# Patient Record
Sex: MALE | Race: BLACK OR AFRICAN AMERICAN | NOT HISPANIC OR LATINO | URBAN - METROPOLITAN AREA
[De-identification: names, ages, dates, MRNs, and addresses within clinical notes are randomized per-mention and may not be internally consistent; named-entity substitution may affect disease eponyms.]

---

## 2017-03-15 ENCOUNTER — OFFICE VISIT (OUTPATIENT)
Dept: OPHTHALMOLOGY | Facility: CLINIC | Age: 62
End: 2017-03-15
Attending: OPHTHALMOLOGY
Payer: COMMERCIAL

## 2017-03-15 DIAGNOSIS — H40.1112 PRIMARY OPEN ANGLE GLAUCOMA OF RIGHT EYE, MODERATE STAGE: Primary | ICD-10-CM

## 2017-03-15 DIAGNOSIS — H40.1123 PRIMARY OPEN ANGLE GLAUCOMA OF LEFT EYE, SEVERE STAGE: ICD-10-CM

## 2017-03-15 PROCEDURE — 99212 OFFICE O/P EST SF 10 MIN: CPT

## 2017-03-15 PROCEDURE — 99212 OFFICE O/P EST SF 10 MIN: CPT | Mod: 25,ZF

## 2017-03-15 ASSESSMENT — EXTERNAL EXAM - RIGHT EYE: OD_EXAM: NORMAL

## 2017-03-15 ASSESSMENT — TONOMETRY
OS_IOP_MMHG: 14
IOP_METHOD: APPLANATION-MMW
OD_IOP_MMHG: 16

## 2017-03-15 ASSESSMENT — VISUAL ACUITY
OD_SC: 20/20
OS_SC: 20/400
OD_SC+: -2
METHOD: SNELLEN - LINEAR

## 2017-03-15 ASSESSMENT — SLIT LAMP EXAM - LIDS
COMMENTS: MEIBOMIAN GLAND DYSFUNCTION
COMMENTS: MEIBOMIAN GLAND DYSFUNCTION

## 2017-03-15 ASSESSMENT — CUP TO DISC RATIO
OD_RATIO: 0.7
OS_RATIO: 0.9

## 2017-03-15 ASSESSMENT — EXTERNAL EXAM - LEFT EYE: OS_EXAM: NORMAL

## 2017-03-15 NOTE — MR AVS SNAPSHOT
After Visit Summary   3/15/2017    Jm Cortez    MRN: [id number]           Patient Information     Date Of Birth          1955        Visit Information        Provider Department      3/15/2017 2:45 PM Negrita Loaiza MD Eye Clinic        Today's Diagnoses     Primary open angle glaucoma of right eye, moderate stage    -  1    Primary open angle glaucoma of left eye, severe stage           Follow-ups after your visit        Follow-up notes from your care team     Return in about 6 months (around 9/15/2017) for visual field 24-2 od, OCT rnfl ou with os dilated.      Your next 10 appointments already scheduled     Sep 13, 2017  2:15 PM CDT   VISUAL FIELD with [facility] EYE VISUAL FIELD   Eye Clinic ([facility])    [address]  [address] Clin [address] [place] [id number]   [phone]            Sep 13, 2017  2:45 PM CDT   RETURN GLAUCOMA with Negrita Loaiza MD   Eye Clinic ([facility])    [address]  [address] [facility]  9[facility] Clin [address] [place] [id number]   [phone]              Who to contact     Please call your clinic at [phone] to:    Ask questions about your health    Make or cancel appointments    Discuss your medicines    Learn about your test results    Speak to your doctor   If you have compliments or concerns about an experience at your clinic, or if you wish to file a complaint, please contact [facility] Physicians Patient Relations at [phone] or email us at [email or web address]         Additional Information About Your Visit        MyChart Information     [employer] is an electronic gateway that provides easy, online access to your medical records. With [employer], you can request a clinic appointment, read your test results, renew a prescription or communicate with your care team.     To sign up for [employer] visit the website at [email or web address]   You will be asked to enter  the access code listed below, as well as some personal information. Please follow the directions to create your username and password.     Your access code is: [email or web address]  Expires: 2017  9:30 AM     Your access code will  in 90 days. If you need help or a new code, please contact your [facility] or call [phone] for assistance.        Care EveryWhere ID     This is your Care EveryWhere ID. This could be used by other organizations to access your [place] medical records  [id number]         Blood Pressure from Last 3 Encounters:   No data found for BP    Weight from Last 3 Encounters:   No data found for Wt              We Performed the Following     Low Vision Central OS     OVF 24-2 Dynamic OD        Primary Care Provider Office Phone # Fax #    Sha Griffiths [phone]       [facility] [address] [place] [address]        Thank you!     Thank you for choosing EYE CLINIC  for your care. Our goal is always to provide you with excellent care. Hearing back from our patients is one way we can continue to improve our services. Please take a few minutes to complete the written survey that you may receive in the mail after your visit with us. Thank you!             Your Updated Medication List - Protect others around you: Learn how to safely use, store and throw away your medicines at www.disposemymeds.org.          This list is accurate as of: 3/15/17  4:21 PM.  Always use your most recent med list.                   Brand Name Dispense Instructions for use    dorzolamide-timolol 2-0.5 % ophthalmic solution    COSOPT    10 mL    Place 1 drop Into the left eye 2 times daily       RANITIDINE HCL PO

## 2017-03-15 NOTE — PROGRESS NOTES
Primary open angle glaucoma (POAG) severe  There is no vision changes. He denies pain, irritation or discomfort. Compliant with medications.     Pachmetry: Thick 580/593    Glaucoma history:  Diode CPC left eye 3-24-15  Selected laser trabeculoplasty (SLT) right eye 08  Argon laser trabeculoplasty right eye 2000  Trabectome right eye 3-11-10  Trabeculectomy with mitomycin-C right eye 7/03 and  9/2011  Trabeculectomy with mitomycin-C left eye 11/2000  Baerveldt left eye (prior to 2003)  Inferotemporal Ahmed/CEIOL left eye 2-    Ocular meds:  Cosopt twice a day LEFT eye (some nasal irritation left side-? cosopt related)    Assessment / Plan  1) Primary open angle glaucoma  Post-op 1 year s/p CPC left eye  Improved from high 20's  Intraocular pressure within normal limits (15/16)    Visual fields today   Right eye 24-2 with superior nasal step, watch for central extension   LVC Stable LEFT eye with temporal island remaining    Plan:  Continue Cosopt twice a day with lid closure    RV 6 months  24-2 right eye only OCT retinal nerve fiber layer both eyes  -dilate left eye for this    Attending Physician Attestation:  Complete documentation of historical and exam elements from today's encounter can be found in the full encounter summary report (not reduplicated in this progress note). I personally obtained the chief complaint(s) and history of present illness. I confirmed and edited asnecessary the review of systems, past medical/surgical history, family history, social history, and examination findings as documented by others; and I examined the patient myself. I personally reviewed the relevant tests, images, and reports as documented above. I formulated and edited as necessary the assessment and plan and discussed the findings and management plan with the patient and family.  - Negrita Loaiza MD 4:08 PM 3/15/2017

## 2017-03-15 NOTE — NURSING NOTE
Chief Complaints and History of Present Illnesses   Patient presents with     Follow Up For     Primary open angle glaucoma (POAG) severe     HPI    Affected eye(s):  Both   Symptoms:        Duration:  6 months   Frequency:  Constant       Do you have eye pain now?:  No      Comments:  Pt. States that he is doing well.  No change in VA BE.  No c/o comfort BE.  Maribeth Courtney COT 2:45 PM March 15, 2017

## 2017-09-13 ENCOUNTER — OFFICE VISIT (OUTPATIENT)
Dept: OPHTHALMOLOGY | Facility: CLINIC | Age: 62
End: 2017-09-13
Attending: OPHTHALMOLOGY
Payer: COMMERCIAL

## 2017-09-13 DIAGNOSIS — H40.1190 POAG (PRIMARY OPEN-ANGLE GLAUCOMA): Primary | ICD-10-CM

## 2017-09-13 DIAGNOSIS — H40.9 SEVERE STAGE GLAUCOMA: ICD-10-CM

## 2017-09-13 DIAGNOSIS — H40.1123 PRIMARY OPEN ANGLE GLAUCOMA OF LEFT EYE, SEVERE STAGE: ICD-10-CM

## 2017-09-13 DIAGNOSIS — H40.1112 PRIMARY OPEN ANGLE GLAUCOMA OF RIGHT EYE, MODERATE STAGE: ICD-10-CM

## 2017-09-13 PROCEDURE — 92133 CPTRZD OPH DX IMG PST SGM ON: CPT | Mod: ZF | Performed by: OPHTHALMOLOGY

## 2017-09-13 PROCEDURE — 92083 EXTENDED VISUAL FIELD XM: CPT | Mod: ZF | Performed by: OPHTHALMOLOGY

## 2017-09-13 PROCEDURE — 99212 OFFICE O/P EST SF 10 MIN: CPT | Mod: ZF

## 2017-09-13 RX ORDER — DORZOLAMIDE HYDROCHLORIDE AND TIMOLOL MALEATE 20; 5 MG/ML; MG/ML
1 SOLUTION/ DROPS OPHTHALMIC 2 TIMES DAILY
Qty: 10 ML | Refills: 11 | Status: SHIPPED | OUTPATIENT
Start: 2017-09-13 | End: 2018-09-21

## 2017-09-13 ASSESSMENT — VISUAL ACUITY
OD_SC+: -2
METHOD: SNELLEN - LINEAR
OS_SC: 20/400 ECC
OD_SC: 20/25

## 2017-09-13 ASSESSMENT — CUP TO DISC RATIO
OD_RATIO: 0.8
OS_RATIO: 0.9

## 2017-09-13 ASSESSMENT — TONOMETRY
OD_IOP_MMHG: 14
IOP_METHOD: APPLANATION
OS_IOP_MMHG: 14
OS_IOP_MMHG: 15
OD_IOP_MMHG: 13
IOP_METHOD: APPLANATION

## 2017-09-13 ASSESSMENT — EXTERNAL EXAM - RIGHT EYE: OD_EXAM: NORMAL

## 2017-09-13 ASSESSMENT — SLIT LAMP EXAM - LIDS
COMMENTS: MEIBOMIAN GLAND DYSFUNCTION
COMMENTS: MEIBOMIAN GLAND DYSFUNCTION

## 2017-09-13 ASSESSMENT — EXTERNAL EXAM - LEFT EYE: OS_EXAM: NORMAL

## 2017-09-13 NOTE — MR AVS SNAPSHOT
After Visit Summary   9/13/2017    Jm Crotez    MRN: [id number]           Patient Information     Date Of Birth          1955        Visit Information        Provider Department      9/13/2017 2:45 PM Negrita Loaiza MD Eye Clinic        Today's Diagnoses     POAG (primary open-angle glaucoma)    -  1    Primary open angle glaucoma of left eye, severe stage        Primary open angle glaucoma of right eye, moderate stage        Severe stage glaucoma           Follow-ups after your visit        Follow-up notes from your care team     Return in about 1 week (around 9/20/2017) for visual field 24-2 od .      Your next 10 appointments already scheduled     Mar 21, 2018  2:15 PM CDT   VISUAL FIELD with [facility] EYE VISUAL FIELD   Eye Clinic ([facility])    [address]  [address] Clin [address] [place] [address]   [phone]            Mar 21, 2018  2:45 PM CDT   RETURN GLAUCOMA with Negrita Loaiza MD   Eye Clinic ([facility])    [address]  [address] [address]   [phone]              Who to contact     Please call your clinic at [phone] to:    Ask questions about your health    Make or cancel appointments    Discuss your medicines    Learn about your test results    Speak to your doctor   If you have compliments or concerns about an experience at your clinic, or if you wish to file a complaint, please contact [facility] Physicians Patient Relations at [phone] or email us at [email or web address]         Additional Information About Your Visit        MyChart Information     C[employer] is an electronic gateway that provides easy, online access to your medical records. With [employer], you can request a clinic appointment, read your test results, renew a prescription or communicate with your care team.     To sign up for [employer] visit the website at  [email or web address]   You will be asked to enter the access code listed below, as well as some personal information. Please follow the directions to create your username and password.     Your access code is: [email or web address]  Expires: 2017  6:30 AM     Your access code will  in 90 days. If you need help or a new code, please contact your [facility] or call [phone] for assistance.        Care EveryWhere ID     This is your Care EveryWhere ID. This could be used by other organizations to access your [place] medical records  [id number]         Blood Pressure from Last 3 Encounters:   No data found for BP    Weight from Last 3 Encounters:   No data found for Wt              We Performed the Following     OCT Optic Nerve RNFL Spectralis OU (both eyes)     OVF 24-2 Dynamic OD          Where to get your medicines      [place] [facility] [place] [address] [facility] [address]  [address] [facility] [address] [address], [place] [address]     Phone:  [phone]     dorzolamide-timolol 2-0.5 % ophthalmic solution          Primary Care Provider Office Phone # Fax #    Sha Griffiths [phone]       [facility] [address] [place] [address]        Equal Access to Services     KULWANT CHACKO AH: Hadii rja stratton hadannao Soanthony, waaxda luqadaha, qaybta kaalmada raciel, sarah galvan. So [facility] [phone].    ATENCIÓN: Si habla español, tiene a alvarado disposición servicios gratuitos de asistencia lingüística. Abby al [phone].    We comply with applicable federal civil rights laws and [place] laws. We do not discriminate on the basis of race, color, national origin, age, disability sex, sexual orientation or gender identity.            Thank you!     Thank you for choosing EYE CLINIC  for your care. Our goal is always to provide you with excellent care. Hearing  back from our patients is one way we can continue to improve our services. Please take a few minutes to complete the written survey that you may receive in the mail after your visit with us. Thank you!             Your Updated Medication List - Protect others around you: Learn how to safely use, store and throw away your medicines at www.disposemymeds.org.          This list is accurate as of: 9/13/17  4:29 PM.  Always use your most recent med list.                   Brand Name Dispense Instructions for use Diagnosis    dorzolamide-timolol 2-0.5 % ophthalmic solution    COSOPT    10 mL    Place 1 drop Into the left eye 2 times daily    Severe stage glaucoma       RANITIDINE HCL PO

## 2017-09-13 NOTE — PROGRESS NOTES
Primary open angle glaucoma (POAG) severe  Pt reports vision has been stable. Eyes are comfortable without pain. Reports good compliance w/ gtts without side effects.      Pachmetry: Thick 580/593    Glaucoma history:  Diode CPC left eye 3-24-15  Selected laser trabeculoplasty (SLT) right eye 08  Argon laser trabeculoplasty right eye 2000  Trabectome right eye 3-11-10  Trabeculectomy with mitomycin-C right eye 7/03 and  9/2011  Trabeculectomy with mitomycin-C left eye 11/2000  Baerveldt left eye (prior to 2003)  Inferotemporal Ahmed/CEIOL left eye 2-    Ocular meds:  Cosopt twice a day LEFT eye (some nasal irritation left side-? cosopt related)    Visual fields today   Right eye 24-2 today w/o demonstration of superior nasal step seen on previous testing 3/2017.    Few scattered areas of decreased sensitivity superior nasally. Watching for central extension.     OCT RNFL today:  Stable in both eyes today, left eye with floor effect, unreliable.      Assessment  Primary open angle glaucoma   Post-op 1.5 year s/p CPC left eye   Improved from high 20's   Intraocular pressure good both eyes  (14/15)  Plan:  Continue Cosopt twice a day with lid closure left eye     RV 6 mo for repeat Octopus visual field 24-2     Attending Physician Attestation:  Complete documentation of historical and exam elements from today's encounter can be found in the full encounter summary report (not reduplicated in this progress note). I personally obtained the chief complaint(s) and history of present illness. I confirmed and edited asnecessary the review of systems, past medical/surgical history, family history, social history, and examination findings as documented by others; and I examined the patient myself. I personally reviewed the relevant tests, images, and reports as documented above. I formulated and edited as necessary the assessment and plan and discussed the findings and management plan with the patient and family.  -  Negrita Loaiza MD 4:21 PM 9/13/2017

## 2017-11-06 ENCOUNTER — OFFICE VISIT (OUTPATIENT)
Dept: OPHTHALMOLOGY | Facility: CLINIC | Age: 62
End: 2017-11-06
Attending: OPHTHALMOLOGY
Payer: COMMERCIAL

## 2017-11-06 DIAGNOSIS — H40.9 SEVERE STAGE GLAUCOMA: ICD-10-CM

## 2017-11-06 DIAGNOSIS — H40.1112 PRIMARY OPEN ANGLE GLAUCOMA OF RIGHT EYE, MODERATE STAGE: Primary | ICD-10-CM

## 2017-11-06 DIAGNOSIS — B30.9 VIRAL CONJUNCTIVITIS OF BOTH EYES: ICD-10-CM

## 2017-11-06 PROCEDURE — 99213 OFFICE O/P EST LOW 20 MIN: CPT | Mod: ZF

## 2017-11-06 ASSESSMENT — TONOMETRY
IOP_METHOD: ICARE
OD_IOP_MMHG: 18
OS_IOP_MMHG: 19

## 2017-11-06 ASSESSMENT — EXTERNAL EXAM - RIGHT EYE: OD_EXAM: NORMAL

## 2017-11-06 ASSESSMENT — VISUAL ACUITY
OD_SC+: -1
METHOD: SNELLEN - LINEAR
OD_SC: 20/20
OS_SC: 20/400 ECC

## 2017-11-06 ASSESSMENT — SLIT LAMP EXAM - LIDS
COMMENTS: MEIBOMIAN GLAND DYSFUNCTION
COMMENTS: MEIBOMIAN GLAND DYSFUNCTION

## 2017-11-06 ASSESSMENT — EXTERNAL EXAM - LEFT EYE: OS_EXAM: NORMAL

## 2017-11-06 ASSESSMENT — CONF VISUAL FIELD
OS_NORMAL: 1
METHOD: COUNTING FINGERS
OD_NORMAL: 1

## 2017-11-06 NOTE — NURSING NOTE
Chief Complaints and History of Present Illnesses   Patient presents with     Consult For     Red eye     HPI    Affected eye(s):  Both   Symptoms:        Frequency:  Constant       Do you have eye pain now?:  No      Comments:  +red BE  Went to Urgent care yesterday they gave him Maxatrol  +discharge more in the am today  States va is the same since last visit  Vianca Vega COT 3:06 PM November 6, 2017

## 2017-11-06 NOTE — MR AVS SNAPSHOT
After Visit Summary   11/6/2017    Jm Cortez    MRN: [id number]           Patient Information     Date Of Birth          1955        Visit Information        Provider Department      11/6/2017 2:45 PM Lisbet Roldan MD Eye Clinic        Today's Diagnoses     Primary open angle glaucoma of right eye, moderate stage    -  1    Severe stage glaucoma - Left Eye        Viral conjunctivitis of both eyes           Follow-ups after your visit        Follow-up notes from your care team     Return if symptoms worsen or fail to improve.      Your next 10 appointments already scheduled     Mar 21, 2018  2:15 PM CDT   VISUAL FIELD with [facility] EYE VISUAL FIELD   Eye Clinic ([facility])    [address]  [address] [facility]  9[facility] Clin [address] [place] [id number] [phone]            Mar 21, 2018  2:45 PM CDT   RETURN GLAUCOMA with Negrita Loaiza MD   Eye Clinic ([facility])    [address]  [address] [facility]  9[facility] Clin [address]a  [facility] [id number] [phone]              Who to contact     Please call your clinic at [phone] to:    Ask questions about your health    Make or cancel appointments    Discuss your medicines    Learn about your test results    Speak to your doctor   If you have compliments or concerns about an experience at your clinic, or if you wish to file a complaint, please contact [facility] Physicians Patient Relations at [phone] or email us at [email or web address]         Additional Information About Your Visit        MyChart Information     [employer] is an electronic gateway that provides easy, online access to your medical records. With [employer], you can request a clinic appointment, read your test results, renew a prescription or communicate with your care team.     To sign up for [employer] visit the website at [email or web address]   You will be asked to enter the access code listed below, as  well as some personal information. Please follow the directions to create your username and password.     Your access code is: [email or web address]  Expires: 2017  5:30 AM     Your access code will  in 90 days. If you need help or a new code, please contact your [facility] Clinic or call [phone] for assistance.        Care EveryWhere ID     This is your Care EveryWhere ID. This could be used by other organizations to access your [place] medical records  [id number]         Blood Pressure from Last 3 Encounters:   No data found for BP    Weight from Last 3 Encounters:   No data found for Wt              Today, you had the following     No orders found for display       Primary Care Provider Office Phone # Fax #    Sha Griffiths [phone]       [facility] [address] [place] [address]        Equal Access to Services     ARY CHACKO : Hadii aad ku hadasho Soomaali, waaxda luqadaha, qaybta kaalmada adeegyada, waxay idiin hayaan mayte kharaadonis luo . So [facility] [phone].    ATENCIÓN: Si habla español, tiene a alvarado disposición servicios gratuitos de asistencia lingüística. Abby al [phone].    We comply with applicable federal civil rights laws and [place] laws. We do not discriminate on the basis of race, color, national origin, age, disability, sex, sexual orientation, or gender identity.            Thank you!     Thank you for choosing EYE CLINIC  for your care. Our goal is always to provide you with excellent care. Hearing back from our patients is one way we can continue to improve our services. Please take a few minutes to complete the written survey that you may receive in the mail after your visit with us. Thank you!             Your Updated Medication List - Protect others around you: Learn how to safely use, store and throw away your medicines at www.disposemymeds.org.          This list is accurate as of: 17  4:46  PM.  Always use your most recent med list.                   Brand Name Dispense Instructions for use Diagnosis    dorzolamide-timolol 2-0.5 % ophthalmic solution    COSOPT    10 mL    Place 1 drop Into the left eye 2 times daily    Severe stage glaucoma       RANITIDINE HCL PO

## 2017-11-06 NOTE — PROGRESS NOTES
HPI  Jm Cortez is a 62 year old male who presents with red, watery eyes since Saturday evening. He developed a cough and runny noseabout 1 week followed by right eye redness on Saturday evning and left eye redness on Sunday. He started having watery discharge in both eyes as well. He went to urgent care where he was started on maxitrol drops and guaifenasin with codeine for the cough. VA is unchanged and the eyes are comfortable.      Assessment & Plan    (B30.9) Viral conjunctivitis of both eyes (primary encounter diagnosis)  Comment: started on Saturday right eye followed by left eye, no signs of blebitis  Plan: Discontinue Maxitrol. Start artificial tears for comfort, good hand hygiene    (H40.1112) Primary open angle glaucoma of right eye, moderate stage  (H40.9) Severe stage glaucoma - Left Eye   Comment: follows with Dr. Loaiza  Plan: monitor    -----------------------------------------------------------------------------------    Patient disposition:   Return if symptoms worsen or fail to improve.     Kristel Olivera MD  PGY-2 Ophthalmology  [phone]    Teaching statement:  Complete documentation of historical and exam elements from today's encounter can be found in the full encounter summary report (not reduplicated in this progress note). I personally obtained the chief complaint(s) and history of present illness.  I confirmed and edited as necessary the review of systems, past medical/surgical history, family history, social history, and examination findings as documented by others; and I examined the patient myself. I personally reviewed the relevant tests, images, and reports as documented above.     I formulated and edited as necessary the assessment and plan and discussed the findings and management plan with the patient and family.    Lisbet Roldan MD  Comprehensive Ophthalmology & Ocular Pathology  Department of Ophthalmology and Visual Neurosciences  [email or web address]  Pager  [phone]

## 2018-01-19 DIAGNOSIS — H40.1132 PRIMARY OPEN ANGLE GLAUCOMA OF BOTH EYES, MODERATE STAGE: Primary | ICD-10-CM

## 2018-01-19 DIAGNOSIS — H40.9 GLAUCOMA, LEFT EYE: ICD-10-CM

## 2018-01-19 NOTE — TELEPHONE ENCOUNTER
Replacement for cosopt needed    Last Office Visit: 11/6/17  Future Office visit:   3/21/18  Last Note:Office Visit     11/6/2017  Eye Clinic    Lisbet Roldan MD   Ophthalmology    Primary open angle glaucoma of right eye, moderate stage +2 more   Dx    Consult For ; Referred by Sha Griffiths MD   Reason for visit    Progress Notes         HPI  Jm Cortez is a 62 year old male who presents with red, watery eyes since Saturday evening. He developed a cough and runny noseabout 1 week followed by right eye redness on Saturday evning and left eye redness on Sunday. He started having watery discharge in both eyes as well. He went to urgent care where he was started on maxitrol drops and guaifenasin with codeine for the cough. VA is unchanged and the eyes are comfortable.        Assessment & Plan   Assessment & Plan     (B30.9) Viral conjunctivitis of both eyes (primary encounter diagnosis)  Comment: started on Saturday right eye followed by left eye, no signs of blebitis  Plan: Discontinue Maxitrol. Start artificial tears for comfort, good hand hygiene     (H40.1112) Primary open angle glaucoma of right eye, moderate stage  (H40.9) Severe stage glaucoma - Left Eye   Comment: follows with Dr. Loaiza  Plan: monitor     -----------------------------------------------------------------------------------     Patient disposition:   Return if symptoms worsen or fail to improve.      Kristel Olivera MD  PGY-2 Ophthalmology  [phone]     Teaching statement:  Complete documentation of historical and exam elements from today's encounter can be found in the full encounter summary report (not reduplicated in this progress note). I personally obtained the chief complaint(s) and history of present illness.  I confirmed and edited as necessary the review of systems, past medical/surgical history, family history, social history, and examination findings as documented by others; and I examined the patient myself. I  personally reviewed the relevant tests, images, and reports as documented above.      I formulated and edited as necessary the assessment and plan and discussed the findings and management plan with the patient and family.     Lisbet Roldan MD  Comprehensive Ophthalmology & Ocular Pathology  Department of Ophthalmology and Visual Neurosciences  [email or web address]  Pager [phone]            Routing refill request to provider for review/approval because:  New rx needed

## 2018-03-21 ENCOUNTER — APPOINTMENT (OUTPATIENT)
Dept: OPHTHALMOLOGY | Facility: CLINIC | Age: 63
End: 2018-03-21
Attending: OPHTHALMOLOGY
Payer: COMMERCIAL

## 2018-03-21 DIAGNOSIS — H40.1133 PRIMARY OPEN ANGLE GLAUCOMA OF BOTH EYES, SEVERE STAGE: Primary | ICD-10-CM

## 2018-03-21 PROCEDURE — 92083 EXTENDED VISUAL FIELD XM: CPT | Mod: ZF | Performed by: OPHTHALMOLOGY

## 2018-03-21 PROCEDURE — G0463 HOSPITAL OUTPT CLINIC VISIT: HCPCS | Mod: ZF

## 2018-03-21 ASSESSMENT — CUP TO DISC RATIO: OD_RATIO: 0.8

## 2018-03-21 ASSESSMENT — TONOMETRY
IOP_METHOD: APPLANATION
OS_IOP_MMHG: 12
OS_IOP_MMHG: 17
IOP_METHOD: APPLANATION
OD_IOP_MMHG: 18
OD_IOP_MMHG: 12

## 2018-03-21 ASSESSMENT — SLIT LAMP EXAM - LIDS
COMMENTS: MEIBOMIAN GLAND DYSFUNCTION
COMMENTS: MEIBOMIAN GLAND DYSFUNCTION

## 2018-03-21 ASSESSMENT — EXTERNAL EXAM - RIGHT EYE: OD_EXAM: NORMAL

## 2018-03-21 ASSESSMENT — CONF VISUAL FIELD
OD_NORMAL: 1
OS_NORMAL: 1

## 2018-03-21 ASSESSMENT — EXTERNAL EXAM - LEFT EYE: OS_EXAM: NORMAL

## 2018-03-21 ASSESSMENT — VISUAL ACUITY
METHOD: SNELLEN - LINEAR
OD_SC: 20/25
OS_SC: CF @ 3'

## 2018-03-21 NOTE — PROGRESS NOTES
Primary open angle glaucoma (POAG) severe  Interval history:  No eye pain, vision changes, tolerating drops well    Pachmetry: Thick 580/593    Glaucoma history:  Diode CPC left eye 3-24-15  Selected laser trabeculoplasty (SLT) right eye 08  Argon laser trabeculoplasty right eye 2000  Trabectome right eye 3-11-10  Trabeculectomy with mitomycin-C right eye 7/03 and  9/2011  Trabeculectomy with mitomycin-C left eye 11/2000  Baerveldt left eye (prior to 2003)  Inferotemporal Ahmed/CEIOL left eye 2-    Ocular meds:  Cosopt twice a day LEFT eye (some nasal irritation left side-? cosopt related)    Visual fields today   Right eye 24-2 03/21/18 w/o demonstration of superior nasal step seen on previous testing 3/2017.    Few nonspecific defects    OCT RNFL 9/2017:  Stable in both eyes today, left eye with floor effect, unreliable.      Assessment  Primary open angle glaucoma   IOP excellent today    visual field stable     Plan:  Continue Cosopt twice a day with lid closure left eye     RV 6 mo for OCT retinal nerve fiber layer right eye       Logan Ocampo MD  PGY3     Attending Physician Attestation:  Complete documentation of historical and exam elements from today's encounter can be found in the full encounter summary report (not reduplicated in this progress note). I personally obtained the chief complaint(s) and history of present illness. I confirmed and edited asnecessary the review of systems, past medical/surgical history, family history, social history, and examination findings as documented by others; and I examined the patient myself. I personally reviewed the relevant tests, images, and reports as documented above. I formulated and edited as necessary the assessment and plan and discussed the findings and management plan with the patient and family.  - Negrita Loaiza MD 3:21 PM 3/21/2018

## 2018-03-21 NOTE — MR AVS SNAPSHOT
After Visit Summary   3/21/2018    Jm Cortez    MRN: [id number]           Patient Information     Date Of Birth          1955        Visit Information        Provider Department      3/21/2018 2:45 PM Negrita Loaiza MD [facility]        Today's Diagnoses     Primary open angle glaucoma of both eyes, severe stage    -  1       Follow-ups after your visit        Follow-up notes from your care team     Return in about 6 months (around 2018) for  od.      Your next 10 appointments already scheduled     Sep 26, 2018  2:45 PM CDT   RETURN GLAUCOMA with Negrita Loaiza MD   Eye Clinic ([facility])    [facility] [address]  9th Fl Clin [address] [place] [address] [id number] [phone]              Who to contact     Please call your clinic at [phone] to:    Ask questions about your health    Make or cancel appointments    Discuss your medicines    Learn about your test results    Speak to your doctor            Additional Information About Your Visit        MyChart Information     Luxr is an electronic gateway that provides easy, online access to your medical records. With Luxr, you can request a clinic appointment, read your test results, renew a prescription or communicate with your care team.     To sign up for [employer] visit the website at [email or web address]   You will be asked to enter the access code listed below, as well as some personal information. Please follow the directions to create your username and password.     Your access code is: [email or web address]  Expires: 2018  7:30 AM     Your access code will  in 90 days. If you need help or a new code, please contact your [facility] or call [phone] for assistance.        Care EveryWhere ID     This is your Care EveryWhere ID. This could be used by other organizations to access your [facility] medical records  [id number]         Blood Pressure  from Last 3 Encounters:   No data found for BP    Weight from Last 3 Encounters:   No data found for Wt              We Performed the Following     OVF 24-2 Dynamic OD        Primary Care Provider Office Phone # Fax #    Sha Griffiths [phone]       [facility] [address] [place] [address]        Equal Access to Services     [facility]: Hadii aad ku hadasho Soomaali, waaxda luqadaha, qaybta kaalmada adeegyada, waxay idiin hayaan adeeg kharash la'aan ah. So [facility] [phone].    ATENCIÓN: Si habla espmarybeth, tiene a alvarado disposición servicios gratuitos de asistencia lingüística. Abby al [phone].    We comply with applicable federal civil rights laws and [place] laws. We do not discriminate on the basis of race, color, national origin, age, disability, sex, sexual orientation, or gender identity.            Thank you!     Thank you for choosing EYE CLINIC  for your care. Our goal is always to provide you with excellent care. Hearing back from our patients is one way we can continue to improve our services. Please take a few minutes to complete the written survey that you may receive in the mail after your visit with us. Thank you!             Your Updated Medication List - Protect others around you: Learn how to safely use, store and throw away your medicines at www.disposemymeds.org.          This list is accurate as of 3/21/18  3:32 PM.  Always use your most recent med list.                   Brand Name Dispense Instructions for use Diagnosis    * dorzolamide-timolol 2-0.5 % ophthalmic solution    COSOPT    10 mL    Place 1 drop Into the left eye 2 times daily    Severe stage glaucoma       * dorzolamide-timolol PF 22.3-6.8 MG/ML opthalmic solution    COSOPT PF    60 each    Apply 1 drop to eye 2 times daily 1DROP TO LEFT EYE TWICE DAILY    Primary open angle glaucoma of both eyes, moderate stage       RANITIDINE HCL PO           * Notice:  This list has 2  medication(s) that are the same as other medications prescribed for you. Read the directions carefully, and ask your doctor or other care provider to review them with you.

## 2018-09-21 DIAGNOSIS — H40.9 SEVERE STAGE GLAUCOMA: ICD-10-CM

## 2018-09-21 RX ORDER — DORZOLAMIDE HYDROCHLORIDE AND TIMOLOL MALEATE 20; 5 MG/ML; MG/ML
1 SOLUTION/ DROPS OPHTHALMIC 2 TIMES DAILY
Qty: 10 ML | Refills: 1 | Status: SHIPPED | OUTPATIENT
Start: 2018-09-21 | End: 2018-09-26

## 2018-09-21 NOTE — TELEPHONE ENCOUNTER
Medication: cosopt      Last Written Prescription Date:  1/19/18  Last Fill Quantity: 60   # refills: 4    Last Office Visit: 3/21/18  Future Office visit: 9/26/18  Continue Cosopt twice a day with lid closure left eye

## 2018-09-26 ENCOUNTER — OFFICE VISIT (OUTPATIENT)
Dept: OPHTHALMOLOGY | Facility: CLINIC | Age: 63
End: 2018-09-26
Attending: OPHTHALMOLOGY
Payer: COMMERCIAL

## 2018-09-26 DIAGNOSIS — H40.1190 POAG (PRIMARY OPEN-ANGLE GLAUCOMA): Primary | ICD-10-CM

## 2018-09-26 DIAGNOSIS — H40.9 SEVERE STAGE GLAUCOMA: ICD-10-CM

## 2018-09-26 PROCEDURE — 92133 CPTRZD OPH DX IMG PST SGM ON: CPT | Mod: ZF | Performed by: OPHTHALMOLOGY

## 2018-09-26 PROCEDURE — G0463 HOSPITAL OUTPT CLINIC VISIT: HCPCS | Mod: ZF

## 2018-09-26 RX ORDER — DORZOLAMIDE HYDROCHLORIDE AND TIMOLOL MALEATE 20; 5 MG/ML; MG/ML
1 SOLUTION/ DROPS OPHTHALMIC 2 TIMES DAILY
Qty: 10 ML | Refills: 1 | Status: SHIPPED | OUTPATIENT
Start: 2018-09-26 | End: 2019-03-27

## 2018-09-26 ASSESSMENT — CONF VISUAL FIELD
OS_INFERIOR_NASAL_RESTRICTION: 1
OD_NORMAL: 1
OS_SUPERIOR_NASAL_RESTRICTION: 1
METHOD: COUNTING FINGERS
OS_INFERIOR_TEMPORAL_RESTRICTION: 3

## 2018-09-26 ASSESSMENT — EXTERNAL EXAM - LEFT EYE: OS_EXAM: NORMAL

## 2018-09-26 ASSESSMENT — REFRACTION_WEARINGRX
OD_SPHERE: +1.00
OS_CYLINDER: +0.50
OD_CYLINDER: +0.50
OS_AXIS: 024
OS_ADD: +2.50
OD_AXIS: 156
OD_ADD: +2.50
OS_SPHERE: +0.75

## 2018-09-26 ASSESSMENT — TONOMETRY
OS_IOP_MMHG: 13
IOP_METHOD: APPLANATION
OD_IOP_MMHG: 11

## 2018-09-26 ASSESSMENT — SLIT LAMP EXAM - LIDS
COMMENTS: MEIBOMIAN GLAND DYSFUNCTION
COMMENTS: MEIBOMIAN GLAND DYSFUNCTION

## 2018-09-26 ASSESSMENT — EXTERNAL EXAM - RIGHT EYE: OD_EXAM: NORMAL

## 2018-09-26 ASSESSMENT — VISUAL ACUITY
METHOD: SNELLEN - LINEAR
OS_SC: 20/300 ECC
OD_SC+: +1
OD_PH_SC: 20/25
OD_SC: 20/30

## 2018-09-26 ASSESSMENT — CUP TO DISC RATIO: OD_RATIO: 0.8

## 2018-09-26 NOTE — NURSING NOTE
Chief Complaints and History of Present Illnesses   Patient presents with     Follow Up For     6 month follow up POAG     HPI    Affected eye(s):  Both   Symptoms:     No floaters   No flashes   No redness   No tearing   No Dryness   No itching         Do you have eye pain now?:  No      Comments:  Pt states vision is the same as last visit.     Yusuf DAVILA September 26, 2018 3:03 PM

## 2018-09-26 NOTE — PROGRESS NOTES
Primary open angle glaucoma (POAG) severe  Interval history:  No eye pain, vision changes, tolerating drops well    Pachmetry: Thick 580/593    Glaucoma history:  Diode CPC left eye 3-24-15  Selected laser trabeculoplasty (SLT) right eye 08  Argon laser trabeculoplasty right eye 2000  Trabectome right eye 3-11-10  Trabeculectomy with mitomycin-C right eye 7/03 and  9/2011  Trabeculectomy with mitomycin-C left eye 11/2000  Baerveldt left eye (prior to 2003)  Inferotemporal Ahmed/CEIOL left eye 2-    Ocular meds:  Cosopt twice a day LEFT eye (some nasal irritation left side-? cosopt related)    Visual fields 3/2018   Right eye 24-2 03/21/18 w/o demonstration of superior nasal step seen on previous testing 3/2017.    Few nonspecific defects    OCT RNFL 9/2018:  Stable in right eye today, left eye with floor effect, unreliable.      Assessment   Primary open angle glaucoma   IOP excellent today    visual field stable     Plan:  Continue Cosopt twice a day with lid closure left eye     RV 6 mo for OVF 24-2  Right eye     Adeel Osborn M.D.  PGY-3, Ophthalmology    Attending Physician Attestation:  Complete documentation of historical and exam elements from today's encounter can be found in the full encounter summary report (not reduplicated in this progress note). I personally obtained the chief complaint(s) and history of present illness. I confirmed and edited asnecessary the review of systems, past medical/surgical history, family history, social history, and examination findings as documented by others; and I examined the patient myself. I personally reviewed the relevant tests, images, and reports as documented above. I formulated and edited as necessary the assessment and plan and discussed the findings and management plan with the patient and family.  - Negrita Loaiza MD 3:48 PM 9/26/2018

## 2018-09-26 NOTE — MR AVS SNAPSHOT
After Visit Summary   2018    Jm Cortez    MRN: [id number]           Patient Information     Date Of Birth          1955        Visit Information        Provider Department      2018 2:45 PM Negrita Loaiza MD [facility]        Today's Diagnoses     POAG (primary open-angle glaucoma) - Both Eyes    -  1    Severe stage glaucoma           Follow-ups after your visit        Follow-up notes from your care team     Return for visual field 24-2 od .      Your next 10 appointments already scheduled     Mar 27, 2019  2:45 PM CDT   RETURN GLAUCOMA with Negrita Loaiza MD   Eye Clinic ([facility])    [address]  9th [address] Clin [address]  [facility] [address]   [phone]              Who to contact     Please call your clinic at [phone] to:    Ask questions about your health    Make or cancel appointments    Discuss your medicines    Learn about your test results    Speak to your doctor            Additional Information About Your Visit        MyChart Information     [email or web address] is an electronic gateway that provides easy, online access to your medical records. With Firebase, you can request a clinic appointment, read your test results, renew a prescription or communicate with your care team.     To sign up for [email or web address] visit the website at [email or web address]   You will be asked to enter the access code listed below, as well as some personal information. Please follow the directions to create your username and password.     Your access code is: [email or web address]  Expires: 2018  6:30 AM     Your access code will  in 90 days. If you need help or a new code, please contact your [facility] or call [phone] for assistance.        Care EveryWhere ID     This is your Care EveryWhere ID. This could be used by other organizations to access your [facility] medical records  [id number]         Blood  Pressure from Last 3 Encounters:   No data found for BP    Weight from Last 3 Encounters:   No data found for Wt              We Performed the Following     OCT Optic Nerve RNFL Spectralis OD (right eye)          Where to get your medicines      These medications were sent to [facility] [address] [place] [address] [address] [facility]  [address] [facility] [address]     Phone:  [phone]     dorzolamide-timolol 2-0.5 % ophthalmic solution          Primary Care Provider Office Phone # Fax #    Sha Griffiths [phone]       [facility] [address] [place] [address]        Equal Access to Services     [facility] : Hadii raj stratton hadasho Soanthony, waaxda luqadaha, qaybta kaalmada adeegyada, sarah galvan. So [facility] [phone].    ATENCIÓN: Si habla español, tiene a alvarado disposición servicios gratuitos de asistencia lingüística. Llame al [phone].    We comply with applicable federal civil rights laws and [place] laws. We do not discriminate on the basis of race, color, national origin, age, disability, sex, sexual orientation, or gender identity.            Thank you!     Thank you for choosing EYE CLINIC  for your care. Our goal is always to provide you with excellent care. Hearing back from our patients is one way we can continue to improve our services. Please take a few minutes to complete the written survey that you may receive in the mail after your visit with us. Thank you!             Your Updated Medication List - Protect others around you: Learn how to safely use, store and throw away your medicines at www.disposemymeds.org.          This list is accurate as of 9/26/18  3:57 PM.  Always use your most recent med list.                   Brand Name Dispense Instructions for use Diagnosis    dorzolamide-timolol 2-0.5 % ophthalmic solution    COSOPT    10 mL    Place 1 drop Into the left eye 2 times daily     Severe stage glaucoma       omeprazole 20 MG CR capsule    priLOSEC     1 capsule daily

## 2019-03-27 ENCOUNTER — OFFICE VISIT (OUTPATIENT)
Dept: OPHTHALMOLOGY | Facility: CLINIC | Age: 64
End: 2019-03-27
Attending: OPHTHALMOLOGY
Payer: COMMERCIAL

## 2019-03-27 DIAGNOSIS — H40.9 SEVERE STAGE GLAUCOMA: ICD-10-CM

## 2019-03-27 DIAGNOSIS — H40.1190 POAG (PRIMARY OPEN-ANGLE GLAUCOMA): Primary | ICD-10-CM

## 2019-03-27 DIAGNOSIS — H40.1133 PRIMARY OPEN ANGLE GLAUCOMA OF BOTH EYES, SEVERE STAGE: ICD-10-CM

## 2019-03-27 PROCEDURE — 92083 EXTENDED VISUAL FIELD XM: CPT | Mod: ZF | Performed by: OPHTHALMOLOGY

## 2019-03-27 PROCEDURE — G0463 HOSPITAL OUTPT CLINIC VISIT: HCPCS | Mod: ZF

## 2019-03-27 RX ORDER — DORZOLAMIDE HYDROCHLORIDE AND TIMOLOL MALEATE 20; 5 MG/ML; MG/ML
1 SOLUTION/ DROPS OPHTHALMIC 2 TIMES DAILY
Qty: 10 ML | Refills: 3 | Status: SHIPPED | OUTPATIENT
Start: 2019-03-27 | End: 2020-11-13

## 2019-03-27 ASSESSMENT — REFRACTION_WEARINGRX
OD_CYLINDER: +0.50
OD_SPHERE: +1.00
OS_CYLINDER: +0.50
OS_SPHERE: +0.75
OD_ADD: +2.50
OD_AXIS: 155
OS_AXIS: 025
OS_ADD: +2.50

## 2019-03-27 ASSESSMENT — VISUAL ACUITY
OD_CC: 20/20
CORRECTION_TYPE: GLASSES
METHOD: SNELLEN - LINEAR

## 2019-03-27 ASSESSMENT — CUP TO DISC RATIO: OD_RATIO: 0.8

## 2019-03-27 ASSESSMENT — EXTERNAL EXAM - RIGHT EYE: OD_EXAM: NORMAL

## 2019-03-27 ASSESSMENT — CONF VISUAL FIELD
OS_SUPERIOR_TEMPORAL_RESTRICTION: 1
OD_NORMAL: 1
OS_SUPERIOR_NASAL_RESTRICTION: 3
OS_INFERIOR_NASAL_RESTRICTION: 3
METHOD: COUNTING FINGERS
OS_INFERIOR_TEMPORAL_RESTRICTION: 3

## 2019-03-27 ASSESSMENT — SLIT LAMP EXAM - LIDS
COMMENTS: MEIBOMIAN GLAND DYSFUNCTION
COMMENTS: MEIBOMIAN GLAND DYSFUNCTION

## 2019-03-27 ASSESSMENT — TONOMETRY
IOP_METHOD: APPLANATION
OS_IOP_MMHG: 16
OD_IOP_MMHG: 14

## 2019-03-27 ASSESSMENT — EXTERNAL EXAM - LEFT EYE: OS_EXAM: NORMAL

## 2019-03-27 NOTE — PROGRESS NOTES
Primary open angle glaucoma (POAG) severe  Interval history:  No eye pain, vision changes, tolerating drops well    Pachmetry: Thick 580/593    Glaucoma history:  Diode CPC left eye 3-24-15  Selected laser trabeculoplasty (SLT) right eye 08  Argon laser trabeculoplasty right eye 2000  Trabectome right eye 3-11-10  Trabeculectomy with mitomycin-C right eye 7/03 and  9/2011  Trabeculectomy with mitomycin-C left eye 11/2000  Baerveldt left eye (prior to 2003)  Inferotemporal Ahmed/CEIOL left eye 2-    Ocular meds:  Cosopt twice a day LEFT eye     Visual fields 3/2019   Right eye 24-2 03/21/18 w/o nasal loss as before   Few nonspecific defects    OCT RNFL 9/2018:  Stable in right eye today, left eye with floor effect, unreliable.      Assessment   Primary open angle glaucoma   IOP good today    visual field stable     Plan:  Continue Cosopt twice a day with lid closure left eye     RV 6 mo for OCT retinal nerve fiber layer   Right eye     Attending Physician Attestation:  Complete documentation of historical and exam elements from today's encounter can be found in the full encounter summary report (not reduplicated in this progress note). I personally obtained the chief complaint(s) and history of present illness. I confirmed and edited asnecessary the review of systems, past medical/surgical history, family history, social history, and examination findings as documented by others; and I examined the patient myself. I personally reviewed the relevant tests, images, and reports as documented above. I formulated and edited as necessary the assessment and plan and discussed the findings and management plan with the patient and family.  - Negrita Loaiza MD 4:00 PM 3/27/2019

## 2019-03-27 NOTE — NURSING NOTE
Chief Complaints and History of Present Illnesses   Patient presents with     Glaucoma Follow-Up     Chief Complaint(s) and History of Present Illness(es)     Glaucoma Follow-Up     Laterality: both eyes    Quality: States va is the same since last visit      Associated symptoms: Negative for redness and dryness    Treatment side effects: none    Compliance with Treatment: always    Pain scale: 0/10              Comments     Vianca CUELLAR 3:07 PM March 27, 2019

## 2019-09-30 ENCOUNTER — OFFICE VISIT (OUTPATIENT)
Dept: OPHTHALMOLOGY | Facility: CLINIC | Age: 64
End: 2019-09-30
Attending: OPHTHALMOLOGY
Payer: COMMERCIAL

## 2019-09-30 DIAGNOSIS — H40.1133 PRIMARY OPEN ANGLE GLAUCOMA OF BOTH EYES, SEVERE STAGE: Primary | ICD-10-CM

## 2019-09-30 PROCEDURE — 92133 CPTRZD OPH DX IMG PST SGM ON: CPT | Mod: ZF | Performed by: OPHTHALMOLOGY

## 2019-09-30 PROCEDURE — G0463 HOSPITAL OUTPT CLINIC VISIT: HCPCS | Mod: ZF

## 2019-09-30 RX ORDER — SILDENAFIL CITRATE 20 MG/1
1 TABLET ORAL PRN
Refills: 11 | COMMUNITY
Start: 2019-08-29

## 2019-09-30 ASSESSMENT — CONF VISUAL FIELD
METHOD: COUNTING FINGERS
OS_INFERIOR_TEMPORAL_RESTRICTION: 2
OD_NORMAL: 1
OS_SUPERIOR_TEMPORAL_RESTRICTION: 1
OS_SUPERIOR_NASAL_RESTRICTION: 2
OS_INFERIOR_NASAL_RESTRICTION: 2

## 2019-09-30 ASSESSMENT — CUP TO DISC RATIO: OD_RATIO: 0.8

## 2019-09-30 ASSESSMENT — VISUAL ACUITY
METHOD: SNELLEN - LINEAR
OD_CC: 20/20
CORRECTION_TYPE: GLASSES

## 2019-09-30 ASSESSMENT — REFRACTION_WEARINGRX
OS_CYLINDER: +0.50
OD_AXIS: 155
OS_ADD: +2.50
OD_SPHERE: +1.00
OD_ADD: +2.50
OS_AXIS: 025
OD_CYLINDER: +0.50
OS_SPHERE: +0.75

## 2019-09-30 ASSESSMENT — EXTERNAL EXAM - LEFT EYE: OS_EXAM: NORMAL

## 2019-09-30 ASSESSMENT — TONOMETRY
OS_IOP_MMHG: 15
IOP_METHOD: APPLANATION
OD_IOP_MMHG: 15

## 2019-09-30 ASSESSMENT — EXTERNAL EXAM - RIGHT EYE: OD_EXAM: NORMAL

## 2019-09-30 ASSESSMENT — SLIT LAMP EXAM - LIDS
COMMENTS: MEIBOMIAN GLAND DYSFUNCTION
COMMENTS: MEIBOMIAN GLAND DYSFUNCTION

## 2019-09-30 NOTE — PROGRESS NOTES
Primary open angle glaucoma (POAG) severe  Interval history:  No eye pain, vision changes, tolerating drops well  Retired age 63    Pachmetry: Thick 580/593    Glaucoma history:  Diode CPC left eye 3-24-15  Selected laser trabeculoplasty (SLT) right eye 08  Argon laser trabeculoplasty right eye 2000  Trabectome right eye 3-11-10  Trabeculectomy with mitomycin-C right eye 7/03 and  9/2011  Trabeculectomy with mitomycin-C left eye 11/2000  Baerveldt left eye (prior to 2003)  Inferotemporal Ahmed/CEIOL left eye 2-    Ocular meds:  Cosopt twice a day LEFT eye     Visual fields 3/2019   Right eye 24-2 03/21/18 w/o nasal loss as before   Few nonspecific defects    OCT RNFL 9/2019:  Stable in right eye today     Assessment   Primary open angle glaucoma   IOP good today    OCT RNFL stable     Plan:  Continue Cosopt twice a day with lid closure left eye     RV 6 mo for OCTOPUS VISUAL FIELD 24-2 Right eye     Justin Lau MD  Ophthalmology Resident, PGY-3    Attending Physician Attestation:  Complete documentation of historical and exam elements from today's encounter can be found in the full encounter summary report (not reduplicated in this progress note). I personally obtained the chief complaint(s) and history of present illness. I confirmed and edited asnecessary the review of systems, past medical/surgical history, family history, social history, and examination findings as documented by others; and I examined the patient myself. I personally reviewed the relevant tests, images, and reports as documented above. I formulated and edited as necessary the assessment and plan and discussed the findings and management plan with the patient and family.  - Negrita Loaiza MD 3:45 PM 9/30/2019

## 2019-09-30 NOTE — NURSING NOTE
Chief Complaints and History of Present Illnesses   Patient presents with     Glaucoma Follow-Up     6 month follow-up Primary open angle glaucoma      Chief Complaint(s) and History of Present Illness(es)     Glaucoma Follow-Up     Comments: 6 month follow-up Primary open angle glaucoma               Comments     Pt denies any significant vision changes in either eye since last visit.  Denies any pain, pressure, irritation, discharge, and tearing.  Currently using Cosopt BID SARAN Handy OT 3:01 PM September 30, 2019

## 2019-12-11 ENCOUNTER — TELEPHONE (OUTPATIENT)
Dept: OPHTHALMOLOGY | Facility: CLINIC | Age: 64
End: 2019-12-11

## 2019-12-16 DIAGNOSIS — H40.9 SEVERE STAGE GLAUCOMA: ICD-10-CM

## 2019-12-16 RX ORDER — DORZOLAMIDE HYDROCHLORIDE AND TIMOLOL MALEATE 20; 5 MG/ML; MG/ML
1 SOLUTION/ DROPS OPHTHALMIC 2 TIMES DAILY
Qty: 10 ML | Refills: 1 | Status: SHIPPED | OUTPATIENT
Start: 2019-12-16 | End: 2020-11-13

## 2019-12-16 NOTE — TELEPHONE ENCOUNTER
I spoke to patient who has eye fatigue and his eye twitches.  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  He is interested in tinted lens assessment.  It is scheduled

## 2019-12-16 NOTE — TELEPHONE ENCOUNTER
[facility] Call Center    Phone Message    May a detailed message be left on voicemail: yes    Reason for Call: Medication Refill Request    Has the patient contacted the pharmacy for the refill? Yes   Name of medication being requested: dorzolamide-timolol (COSOPT) 2-0.5 % ophthalmic solution  Provider who prescribed the medication: Dr. James  Pharmacy:    [facility] [address] [place] [address]      Date medication is needed: asap - pt is almost out   \      Action Taken: Message routed to:  [facility] Eye

## 2019-12-16 NOTE — TELEPHONE ENCOUNTER
Last Clinic Visit: 9/30/19, [place] 4/2/20  Last clinic note: Continue Cosopt twice a day with lid closure left eye

## 2019-12-23 ENCOUNTER — ALLIED HEALTH/NURSE VISIT (OUTPATIENT)
Dept: OPHTHALMOLOGY | Facility: CLINIC | Age: 64
End: 2019-12-23
Attending: OPHTHALMOLOGY
Payer: COMMERCIAL

## 2019-12-23 DIAGNOSIS — H53.143 PHOTOPHOBIA OF BOTH EYES: Primary | ICD-10-CM

## 2019-12-23 ASSESSMENT — PACHYMETRY
OS_CT(UM): 593
OD_CT(UM): 580

## 2020-03-25 ENCOUNTER — TELEPHONE (OUTPATIENT)
Dept: OPHTHALMOLOGY | Facility: CLINIC | Age: 65
End: 2020-03-25

## 2020-03-25 NOTE — TELEPHONE ENCOUNTER
COVID-19 RESCHEDULES    Date contacted: March 25, 2020 1:16 PM    Type of contact:  left message    Current appointment date: Thurs, 4/2    Attending provider: Mario    Current Eye Symptoms: n/a    Refills needed: n/a    Best contact for rescheduling: n/a    Best date/time for rescheduling: n/a    Lluvia TANNER 1:16 PM March 25, 2020

## 2020-05-04 ENCOUNTER — TELEPHONE (OUTPATIENT)
Dept: OPHTHALMOLOGY | Facility: CLINIC | Age: 65
End: 2020-05-04

## 2020-05-04 NOTE — LETTER
May 4, 2020         Jm Cortez  [address] [place] [id number]        Dear Jm:    We attempted to reach you by telephone regarding your appointment on 5/11/20 with Dr. Martin.  Unfortunately, your appointment has been canceled at this time; as we have been asked to cancel all clinics due the concern for COVID-19.  Please contact our appointment line at [phone] to reschedule on a Tuesday or Wednesday coming up OR MyChart our clinic.  Ask to speak to the eye clinic triage nurse if you are unable to get an appointment within the next week or two through the call center.        We apologize for any inconvenience this may have caused and look forward to hearing form you.        Sincerely,         Dr Aidee Martin MD   Department of Ophthalmology   [facility]

## 2020-05-04 NOTE — TELEPHONE ENCOUNTER
COVID-19 RESCHEDULES    Date contacted: May 4, 2020 3:41 PM    Type of contact: Spoke with patient or left message    Current appointment date: 5/11/20    Attending provider: Mario    Current Eye Symptoms: n/a    Refills needed: n/a    Best contact for rescheduling: same    Best date/time for rescheduling: Tuesday or Wednesday upcoming (double book him if needed - gave him call center number and told him to ask for Triage nurse to get booked in as call center probably won't be able to double book him) larry Mitchell COA 3:41 PM May 4, 2020

## 2020-05-04 NOTE — TELEPHONE ENCOUNTER
Reviewed message below from tech  Spoke to pt at 0915  Scheduled may 12th at 2 PM with Dr. Martin    Pt aware of date/time/location at [facility]    Note to facilitator  Delonte Case RN 9:22 AM 05/05/20    ----      Forwarded to Eye Triage per previous message.    Please call Pt Jm Cortez MRN [id number]    [phone]

## 2020-05-12 ENCOUNTER — OFFICE VISIT (OUTPATIENT)
Dept: OPHTHALMOLOGY | Facility: CLINIC | Age: 65
End: 2020-05-12
Attending: OPHTHALMOLOGY
Payer: COMMERCIAL

## 2020-05-12 DIAGNOSIS — Z96.1 PSEUDOPHAKIA OF LEFT EYE: ICD-10-CM

## 2020-05-12 DIAGNOSIS — H40.1133 PRIMARY OPEN ANGLE GLAUCOMA OF BOTH EYES, SEVERE STAGE: Primary | ICD-10-CM

## 2020-05-12 DIAGNOSIS — H25.11 NUCLEAR SENILE CATARACT OF RIGHT EYE: ICD-10-CM

## 2020-05-12 PROCEDURE — G0463 HOSPITAL OUTPT CLINIC VISIT: HCPCS | Mod: ZF

## 2020-05-12 PROCEDURE — 92083 EXTENDED VISUAL FIELD XM: CPT | Mod: ZF | Performed by: OPHTHALMOLOGY

## 2020-05-12 ASSESSMENT — REFRACTION_WEARINGRX
OS_CYLINDER: +0.50
OD_SPHERE: +1.00
OS_ADD: +2.50
OS_SPHERE: +0.75
OD_ADD: +2.50
OD_CYLINDER: +0.50
OS_AXIS: 025
OD_AXIS: 155

## 2020-05-12 ASSESSMENT — VISUAL ACUITY
METHOD: SNELLEN - LINEAR
OD_CC: 20/20
OD_CC+: +
CORRECTION_TYPE: GLASSES
OS_CC: CF @ 3'

## 2020-05-12 ASSESSMENT — CONF VISUAL FIELD
OS_SUPERIOR_TEMPORAL_RESTRICTION: 3
OS_INFERIOR_TEMPORAL_RESTRICTION: 1
OS_SUPERIOR_NASAL_RESTRICTION: 1
OD_NORMAL: 1
OS_INFERIOR_NASAL_RESTRICTION: 1
METHOD: COUNTING FINGERS

## 2020-05-12 ASSESSMENT — TONOMETRY
OS_IOP_MMHG: 15
IOP_METHOD: APPLANATION
OD_IOP_MMHG: 15

## 2020-05-12 ASSESSMENT — EXTERNAL EXAM - LEFT EYE: OS_EXAM: NORMAL

## 2020-05-12 ASSESSMENT — SLIT LAMP EXAM - LIDS
COMMENTS: MEIBOMIAN GLAND DYSFUNCTION
COMMENTS: MEIBOMIAN GLAND DYSFUNCTION

## 2020-05-12 ASSESSMENT — EXTERNAL EXAM - RIGHT EYE: OD_EXAM: NORMAL

## 2020-05-12 NOTE — PATIENT INSTRUCTIONS
Patient will continue on Cosopt (Timolol/Dorzolamide) which is a blue top drop 2x/day (12 hours apart) in the LEFT EYE ONLY.  Patient will return to clinic in 4-6 months with visual field test, dilated eye exam and disc photos.

## 2020-05-12 NOTE — PROGRESS NOTES
1)POAG -- s/p Diode TSCPC OS (3/24/15 by Dr. Loaiza), s/p Trab OD x2 (9/2011 and 7/03), s/p Trab OS (11/2000), s/p IT Ahmed tube OS (2/21/06), s/p Trabectome OD (3/11/2010), s/p Baerveldt OS prior to 2003, s/p SLT OD in 2008, s/p ALT OD in 200), setiology of vison loss OS  -- K pachy: 580/593 in 2014   Tmax:  OS:27 per old notes   HVF: OD:Nasal dec sens and OS:Dense superior and inferuor arcaute defects (temproal [place])     CDR:     HRT/OCT: Mod-Severe RNFL thinning (stable from 4093-7702      FHX of Glc: No      Gonio:       Intolerant to: AGN -- injection, Lumigan?travatan -- injection     Asthma/COPD: No   Steroid Use: No    Kidney Stones: No    Sulfa Allergy: No     IOP targets:  2)PCIOL OS -- VA OD:20/25 and OS:20/400 in 2011   3)?H/O Uveitis per old notes  4)NS OD -- works at [employer]       Patient will continue on Cosopt (Timolol/Dorzolamide) which is a blue top drop 2x/day (12 hours apart) in the LEFT EYE ONLY.  Patient will return to clinic in 4-6 months with visual field test, dilated eye exam and disc photos.      Attending Physician Attestation:  Complete documentation of historical and exam elements from today's encounter can be found in the full encounter summary report (not reduplicated in this progress note). I personally obtained the chief complaint(s) and history of present illness.  I confirmed and edited as necessary the review of systems, past medical/surgical history, family history, social history, and examination findings as documented by others; and I examined the patient myself. I personally reviewed the relevant tests, images, and reports as documented above. I formulated and edited as necessary the assessment and plan and discussed the findings and management plan with the patient and family.  - Aidee Martin MD

## 2020-05-12 NOTE — NURSING NOTE
Chief Complaints and History of Present Illnesses   Patient presents with     Follow Up     Primary open angle glaucoma of both eyes     Chief Complaint(s) and History of Present Illness(es)     Follow Up     Laterality: both eyes    Course: stable    Associated symptoms: Negative for eye pain, dryness, redness and tearing    Treatments tried: eye drops    Pain scale: 0/10    Comments: Primary open angle glaucoma of both eyes              Comments     He states that his vision has seemed stable in both eyes, since his last eye exam.  Patient denies having any eye discomfort.  He is very compliant in using Cosopt twice a day in his left eye.    Linda Bishop, COT 2:10 PM  May 12, 2020

## 2020-11-11 DIAGNOSIS — H40.1133 PRIMARY OPEN ANGLE GLAUCOMA OF BOTH EYES, SEVERE STAGE: Primary | ICD-10-CM

## 2020-11-13 DIAGNOSIS — H40.9 SEVERE STAGE GLAUCOMA: ICD-10-CM

## 2020-11-13 RX ORDER — DORZOLAMIDE HYDROCHLORIDE AND TIMOLOL MALEATE 20; 5 MG/ML; MG/ML
1 SOLUTION/ DROPS OPHTHALMIC 2 TIMES DAILY
Qty: 10 ML | Refills: 1 | Status: SHIPPED | OUTPATIENT
Start: 2020-11-13 | End: 2020-11-17

## 2020-11-13 NOTE — TELEPHONE ENCOUNTER
Pt recommended about a 4-6 month f/u from last may's visit    Rx sent with request to call clinic for appt on Rx for further refills    Delonte Case RN 3:17 PM 11/13/20        [facility]    Phone Message    May a detailed message be left on voicemail: yes     Reason for Call: Medication Refill Request    Has the patient contacted the pharmacy for the refill? Yes   Name of medication being requested: dorzolamide-timolol (COSOPT) 2-0.5 % ophthalmic solution   Provider who prescribed the medication: Opal Zelaya RN  Pharmacy: [facility] [address] [place] [address]  Date medication is needed: ASAP     Pt called wondering if he could get this Rx refilled before the Appt on 11/17/20 as he is out of the eye drops. Please advise, thank you!    Action Taken: Message routed to:  [facility]    Travel Screening: Not Applicable

## 2020-11-16 NOTE — PROGRESS NOTES
CC: glaucoma follow up      HPI: Jm Cortez is here for glaucoma follow up.     s/p Diode TSCPC OS (3/24/15 by Dr. Loaiza), s/p Trab OD x2 (9/2011 and 7/03), s/p Trab OS (11/2000), s/p IT Ahmed tube OS (2/21/06), s/p Trabectome OD (3/11/2010), s/p Baerveldt OS prior to 2003, s/p SLT OD in 2008, s/p ALT OD in 200)     A/P:  1)POAG --     -  FHX of Glc: No     - K pachy: 580/593 in 2014   Tmax:  OS:27 per old notes   CDR:       Gonio:       - HVF: OD:Nasal dec sens and OS:Dense superior and inferuor arcaute defects (temproal island)         - HRT/OCT: Mod-Severe RNFL thinning (stable from 0445-5631         - Intolerant to: AGN -- injection, Lumigan?travatan -- injection     Asthma/COPD: No   Steroid Use: No    Kidney Stones: No    Sulfa Allergy: No     IOP targets:    2)PCIOL OS -- VA OD:20/25 and OS:20/400 in 2011     3)?H/O Uveitis per old notes    4)NS OD -- works at [employer]       Patient will continue on Cosopt (Timolol/Dorzolamide) which is a blue top drop 2x/day (12 hours apart) in the LEFT EYE ONLY.  Patient will return to clinic in 4-6 months with visual field test right eye only, dilated eye exam and disc photos.        Complete documentation of historical and exam elements from today's encounter can be found in the full encounter summary report (not reduplicated in this progress note). I personally obtained the chief complaint(s) and history of present illness.  I confirmed and edited as necessary the review of systems, past medical/surgical history, family history, social history, and examination findings as documented by others; and I examined the patient myself. I personally reviewed the relevant tests, images, and reports as documented above. I formulated and edited as necessary the assessment and plan and discussed the findings and management plan with the patient and family.    Kartik Soliz MD

## 2020-11-17 ENCOUNTER — OFFICE VISIT (OUTPATIENT)
Dept: OPHTHALMOLOGY | Facility: CLINIC | Age: 65
End: 2020-11-17
Attending: OPHTHALMOLOGY
Payer: COMMERCIAL

## 2020-11-17 DIAGNOSIS — H40.9 SEVERE STAGE GLAUCOMA: ICD-10-CM

## 2020-11-17 DIAGNOSIS — Z96.1 PSEUDOPHAKIA OF LEFT EYE: ICD-10-CM

## 2020-11-17 DIAGNOSIS — H40.1133 PRIMARY OPEN ANGLE GLAUCOMA OF BOTH EYES, SEVERE STAGE: Primary | ICD-10-CM

## 2020-11-17 DIAGNOSIS — H25.11 NUCLEAR SENILE CATARACT OF RIGHT EYE: ICD-10-CM

## 2020-11-17 PROCEDURE — G0463 HOSPITAL OUTPT CLINIC VISIT: HCPCS

## 2020-11-17 PROCEDURE — 92014 COMPRE OPH EXAM EST PT 1/>: CPT | Performed by: OPHTHALMOLOGY

## 2020-11-17 PROCEDURE — 92133 CPTRZD OPH DX IMG PST SGM ON: CPT | Performed by: OPHTHALMOLOGY

## 2020-11-17 PROCEDURE — 92250 FUNDUS PHOTOGRAPHY W/I&R: CPT | Performed by: OPHTHALMOLOGY

## 2020-11-17 PROCEDURE — 99207 OPTIC NERVE PHOTOS OU (BOTH EYES): CPT | Performed by: OPHTHALMOLOGY

## 2020-11-17 RX ORDER — DORZOLAMIDE HYDROCHLORIDE AND TIMOLOL MALEATE 20; 5 MG/ML; MG/ML
1 SOLUTION/ DROPS OPHTHALMIC 2 TIMES DAILY
Qty: 10 ML | Refills: 3 | Status: SHIPPED | OUTPATIENT
Start: 2020-11-17 | End: 2021-11-03

## 2020-11-17 ASSESSMENT — VISUAL ACUITY
CORRECTION_TYPE: GLASSES
METHOD: SNELLEN - LINEAR
OD_CC: 20/20
OD_CC+: -2

## 2020-11-17 ASSESSMENT — EXTERNAL EXAM - LEFT EYE: OS_EXAM: NORMAL

## 2020-11-17 ASSESSMENT — CUP TO DISC RATIO: OD_RATIO: 0.7

## 2020-11-17 ASSESSMENT — SLIT LAMP EXAM - LIDS
COMMENTS: MEIBOMIAN GLAND DYSFUNCTION
COMMENTS: MEIBOMIAN GLAND DYSFUNCTION

## 2020-11-17 ASSESSMENT — REFRACTION_WEARINGRX
OD_CYLINDER: +0.50
OS_AXIS: 025
OD_SPHERE: +1.00
OD_ADD: +2.50
OS_SPHERE: +0.75
OS_ADD: +2.50
OD_AXIS: 155
OS_CYLINDER: +0.50

## 2020-11-17 ASSESSMENT — CONF VISUAL FIELD
OS_INFERIOR_NASAL_RESTRICTION: 1
METHOD: COUNTING FINGERS
OS_SUPERIOR_TEMPORAL_RESTRICTION: 3
OD_NORMAL: 1
OS_SUPERIOR_NASAL_RESTRICTION: 1
OS_INFERIOR_TEMPORAL_RESTRICTION: 1

## 2020-11-17 ASSESSMENT — EXTERNAL EXAM - RIGHT EYE: OD_EXAM: NORMAL

## 2020-11-17 ASSESSMENT — TONOMETRY
OS_IOP_MMHG: 13
OD_IOP_MMHG: 14
IOP_METHOD: APPLANATION

## 2020-11-17 NOTE — NURSING NOTE
Chief Complaints and History of Present Illnesses   Patient presents with     Glaucoma     POAG, both eyes - previous Dr. Martin pt     Chief Complaint(s) and History of Present Illness(es)     Glaucoma     Laterality: left eye    Quality: blurred    Treatment side effects: none    Compliance with Treatment: always    Comments: POAG, both eyes - previous Dr. Martin pt              Comments     Pt denies any significant vision changes in either eye since last visit.  Denies any pain, irritation, discharge, and tearing.  Ocular meds: Cosopt BID DANGELO Roa OT 2:03 PM November 17, 2020

## 2021-05-12 DIAGNOSIS — H40.1133 PRIMARY OPEN ANGLE GLAUCOMA OF BOTH EYES, SEVERE STAGE: Primary | ICD-10-CM

## 2021-05-18 ENCOUNTER — OFFICE VISIT (OUTPATIENT)
Dept: OPHTHALMOLOGY | Facility: CLINIC | Age: 66
End: 2021-05-18
Attending: OPHTHALMOLOGY
Payer: COMMERCIAL

## 2021-05-18 DIAGNOSIS — H25.11 NUCLEAR SENILE CATARACT OF RIGHT EYE: ICD-10-CM

## 2021-05-18 DIAGNOSIS — Z96.1 PSEUDOPHAKIA OF LEFT EYE: ICD-10-CM

## 2021-05-18 DIAGNOSIS — H40.1133 PRIMARY OPEN ANGLE GLAUCOMA OF BOTH EYES, SEVERE STAGE: ICD-10-CM

## 2021-05-18 DIAGNOSIS — H40.9 SEVERE STAGE GLAUCOMA: Primary | ICD-10-CM

## 2021-05-18 PROCEDURE — 92083 EXTENDED VISUAL FIELD XM: CPT | Performed by: OPHTHALMOLOGY

## 2021-05-18 PROCEDURE — G0463 HOSPITAL OUTPT CLINIC VISIT: HCPCS | Mod: 25

## 2021-05-18 PROCEDURE — 92012 INTRM OPH EXAM EST PATIENT: CPT | Performed by: OPHTHALMOLOGY

## 2021-05-18 ASSESSMENT — TONOMETRY
OS_IOP_MMHG: 17
OD_IOP_MMHG: 15
IOP_METHOD: ICARE

## 2021-05-18 ASSESSMENT — CONF VISUAL FIELD
OS_SUPERIOR_TEMPORAL_RESTRICTION: 2
OS_INFERIOR_TEMPORAL_RESTRICTION: 1
OS_SUPERIOR_NASAL_RESTRICTION: 1
OS_INFERIOR_NASAL_RESTRICTION: 1
METHOD: COUNTING FINGERS
OD_NORMAL: 1

## 2021-05-18 ASSESSMENT — VISUAL ACUITY
OS_CC: CF @ 2 FT
METHOD: SNELLEN - LINEAR
OD_CC: 20/20
CORRECTION_TYPE: GLASSES

## 2021-05-18 ASSESSMENT — SLIT LAMP EXAM - LIDS
COMMENTS: MEIBOMIAN GLAND DYSFUNCTION
COMMENTS: MEIBOMIAN GLAND DYSFUNCTION

## 2021-05-18 ASSESSMENT — EXTERNAL EXAM - RIGHT EYE: OD_EXAM: NORMAL

## 2021-05-18 ASSESSMENT — REFRACTION_WEARINGRX
OD_AXIS: 155
OD_CYLINDER: +0.50
OS_ADD: +2.50
OD_SPHERE: +1.00
OS_CYLINDER: +0.50
OD_ADD: +2.50
OS_SPHERE: +0.75
OS_AXIS: 025

## 2021-05-18 ASSESSMENT — CUP TO DISC RATIO: OD_RATIO: 0.7

## 2021-05-18 ASSESSMENT — EXTERNAL EXAM - LEFT EYE: OS_EXAM: NORMAL

## 2021-05-18 NOTE — PROGRESS NOTES
CC: glaucoma follow up      HPI: Jm Cortez is here for glaucoma follow up.     s/p Diode TSCPC OS (3/24/15 by Dr. Loaiza), s/p Trab OD x2 (9/2011 and 7/03), s/p Trab OS (11/2000), s/p IT Ahmed tube OS (2/21/06), s/p Trabectome OD (3/11/2010), s/p Baerveldt OS prior to 2003, s/p SLT OD in 2008, s/p ALT OD in 200)     A/P:  1)POAG --     -  FHX of Glc: No     - K pachy: 580/593 in 2014   Tmax:  OS:27 per old notes   CDR:       Gonio:       - HVF: OD:Nasal dec sens (fluctuation) and OS:Dense superior and inferuor arcaute defects (temproal island) not repeating since 2017     - HRT/OCT: Mod-Severe RNFL thinning (stable from 6464-2793         - Intolerant to: AGN -- injection, Lumigan?travatan -- injection     Asthma/COPD: No   Steroid Use: No    Kidney Stones: No    Sulfa Allergy: No     IOP targets:    2)PCIOL OS -- VA OD:20/25 and OS:20/400 in 2011     3)?H/O Uveitis per old notes    4)NS OD -- works at [employer]       Patient will continue on Cosopt (Timolol/Dorzolamide) which is a blue top drop 2x/day (12 hours apart) in the LEFT EYE ONLY.  Patient will return to clinic in 4-6 months with visual field test right eye 24-2 and left eye LVC and IOP check      Complete documentation of historical and exam elements from today's encounter can be found in the full encounter summary report (not reduplicated in this progress note). I personally obtained the chief complaint(s) and history of present illness.  I confirmed and edited as necessary the review of systems, past medical/surgical history, family history, social history, and examination findings as documented by others; and I examined the patient myself. I personally reviewed the relevant tests, images, and reports as documented above. I formulated and edited as necessary the assessment and plan and discussed the findings and management plan with the patient and family.    Kartik Soliz MD

## 2021-10-28 DIAGNOSIS — H40.1133 PRIMARY OPEN ANGLE GLAUCOMA OF BOTH EYES, SEVERE STAGE: Primary | ICD-10-CM

## 2021-11-03 ENCOUNTER — OFFICE VISIT (OUTPATIENT)
Dept: OPHTHALMOLOGY | Facility: CLINIC | Age: 66
End: 2021-11-03
Attending: OPHTHALMOLOGY
Payer: COMMERCIAL

## 2021-11-03 DIAGNOSIS — Z96.1 PSEUDOPHAKIA OF LEFT EYE: ICD-10-CM

## 2021-11-03 DIAGNOSIS — H25.11 NUCLEAR SENILE CATARACT OF RIGHT EYE: ICD-10-CM

## 2021-11-03 DIAGNOSIS — H40.9 SEVERE STAGE GLAUCOMA: Primary | ICD-10-CM

## 2021-11-03 DIAGNOSIS — H40.1133 PRIMARY OPEN ANGLE GLAUCOMA OF BOTH EYES, SEVERE STAGE: ICD-10-CM

## 2021-11-03 PROCEDURE — G0463 HOSPITAL OUTPT CLINIC VISIT: HCPCS

## 2021-11-03 PROCEDURE — 92083 EXTENDED VISUAL FIELD XM: CPT | Performed by: OPHTHALMOLOGY

## 2021-11-03 PROCEDURE — 92014 COMPRE OPH EXAM EST PT 1/>: CPT | Performed by: OPHTHALMOLOGY

## 2021-11-03 RX ORDER — DORZOLAMIDE HYDROCHLORIDE AND TIMOLOL MALEATE 20; 5 MG/ML; MG/ML
1 SOLUTION/ DROPS OPHTHALMIC 2 TIMES DAILY
Qty: 10 ML | Refills: 3 | Status: SHIPPED | OUTPATIENT
Start: 2021-11-03 | End: 2023-02-14

## 2021-11-03 ASSESSMENT — SLIT LAMP EXAM - LIDS
COMMENTS: MEIBOMIAN GLAND DYSFUNCTION
COMMENTS: MEIBOMIAN GLAND DYSFUNCTION

## 2021-11-03 ASSESSMENT — REFRACTION_WEARINGRX
OD_SPHERE: +1.00
OS_AXIS: 025
OS_CYLINDER: +0.50
OS_SPHERE: +0.75
SPECS_TYPE: BIFOCAL
OD_CYLINDER: +0.50
OD_AXIS: 155
OD_ADD: +2.50
OS_ADD: +2.50

## 2021-11-03 ASSESSMENT — CONF VISUAL FIELD
OS_SUPERIOR_TEMPORAL_RESTRICTION: 1
OS_INFERIOR_TEMPORAL_RESTRICTION: 1
METHOD: COUNTING FINGERS
OS_INFERIOR_NASAL_RESTRICTION: 1
OD_NORMAL: 1
OS_SUPERIOR_NASAL_RESTRICTION: 3

## 2021-11-03 ASSESSMENT — EXTERNAL EXAM - RIGHT EYE: OD_EXAM: NORMAL

## 2021-11-03 ASSESSMENT — EXTERNAL EXAM - LEFT EYE: OS_EXAM: NORMAL

## 2021-11-03 ASSESSMENT — VISUAL ACUITY
OS_CC: CF @ 2'
OD_CC: 20/20
CORRECTION_TYPE: GLASSES
METHOD: SNELLEN - LINEAR

## 2021-11-03 ASSESSMENT — TONOMETRY
OD_IOP_MMHG: 15
IOP_METHOD: TONOPEN
OS_IOP_MMHG: 18

## 2021-11-03 ASSESSMENT — CUP TO DISC RATIO: OD_RATIO: 0.7

## 2021-11-03 NOTE — NURSING NOTE
Chief Complaints and History of Present Illnesses   Patient presents with     Glaucoma Follow-Up     IOP Recheck      Chief Complaint(s) and History of Present Illness(es)     Glaucoma Follow-Up     Laterality: both eyes    Associated symptoms: eye pain (intermittent - every now and then).  Negative for flashes, floaters, burning, swelling and itching    Treatment side effects: none    Compliance with Treatment: always    Pain scale: 0/10              IOP Recheck                Comments     Pt here today for 6 mos glaucoma follow up & visual field testing.  Nothing to report, occasional pain in LE but not consistent enough to consider bothersome.    Ocular meds =   dorzolamide-timolol (COSOPT) 2-0.5 % ophthalmic solution 1gtts LE Q BID    Jamia TANNER, JENISE 2:42 PM 11/03/2021

## 2021-11-03 NOTE — PROGRESS NOTES
CC: glaucoma follow up      HPI: Jm Cortez is here for glaucoma follow up.     s/p Diode TSCPC OS (3/24/15 by Dr. Loaiza), s/p Trab OD x2 (9/2011 and 7/03), s/p Trab OS (11/2000), s/p IT Ahmed tube OS (2/21/06), s/p Trabectome OD (3/11/2010), s/p Baerveldt OS prior to 2003, s/p SLT OD in 2008, s/p ALT OD in 200)     A/P:  1)POAG --     -  FHX of Glc: No     - K pachy: 580/593 in 2014   Tmax:  OS:27 per old notes   CDR:       Gonio:       - HVF: OD:Nasal dec sens (fluctuation) stable compared to previous and OS:Dense superior and inferuor arcaute defects (temproal island) not repeating since 2017     - HRT/OCT: Mod-Severe RNFL thinning (stable from 6271-2904         - Intolerant to: AGN -- injection, Lumigan?travatan -- injection     Asthma/COPD: No   Steroid Use: No    Kidney Stones: No    Sulfa Allergy: No     IOP targets:    2)PCIOL OS -- VA OD:20/25 and OS:20/400 in 2011     3)?H/O Uveitis per old notes    4)NS OD -- works at [employer] [facility]       Patient will continue on Cosopt (Timolol/Dorzolamide) which is a blue top drop 2x/day (12 hours apart) in the LEFT EYE ONLY.  Patient will return to clinic in 4-6 months with OCT RNFL  and IOP check      Complete documentation of historical and exam elements from today's encounter can be found in the full encounter summary report (not reduplicated in this progress note). I personally obtained the chief complaint(s) and history of present illness.  I confirmed and edited as necessary the review of systems, past medical/surgical history, family history, social history, and examination findings as documented by others; and I examined the patient myself. I personally reviewed the relevant tests, images, and reports as documented above. I formulated and edited as necessary the assessment and plan and discussed the findings and management plan with the patient and family.    Kartik Soliz MD

## 2022-05-10 DIAGNOSIS — H40.1133 PRIMARY OPEN ANGLE GLAUCOMA OF BOTH EYES, SEVERE STAGE: Primary | ICD-10-CM

## 2022-05-17 ENCOUNTER — OFFICE VISIT (OUTPATIENT)
Dept: OPHTHALMOLOGY | Facility: CLINIC | Age: 67
End: 2022-05-17
Attending: OPHTHALMOLOGY
Payer: COMMERCIAL

## 2022-05-17 ENCOUNTER — TELEPHONE (OUTPATIENT)
Dept: OPHTHALMOLOGY | Facility: CLINIC | Age: 67
End: 2022-05-17

## 2022-05-17 DIAGNOSIS — H26.492 PCO (POSTERIOR CAPSULAR OPACIFICATION), LEFT: ICD-10-CM

## 2022-05-17 DIAGNOSIS — Z96.1 PSEUDOPHAKIA OF LEFT EYE: ICD-10-CM

## 2022-05-17 DIAGNOSIS — H25.11 NUCLEAR SENILE CATARACT OF RIGHT EYE: ICD-10-CM

## 2022-05-17 DIAGNOSIS — H40.9 SEVERE STAGE GLAUCOMA: ICD-10-CM

## 2022-05-17 DIAGNOSIS — H40.1133 PRIMARY OPEN ANGLE GLAUCOMA OF BOTH EYES, SEVERE STAGE: Primary | ICD-10-CM

## 2022-05-17 PROCEDURE — 92014 COMPRE OPH EXAM EST PT 1/>: CPT | Mod: 25 | Performed by: OPHTHALMOLOGY

## 2022-05-17 PROCEDURE — 250N000009 HC RX 250: Performed by: OPHTHALMOLOGY

## 2022-05-17 PROCEDURE — 92133 CPTRZD OPH DX IMG PST SGM ON: CPT | Performed by: OPHTHALMOLOGY

## 2022-05-17 PROCEDURE — G0463 HOSPITAL OUTPT CLINIC VISIT: HCPCS | Mod: 25

## 2022-05-17 PROCEDURE — 66821 AFTER CATARACT LASER SURGERY: CPT | Mod: LT | Performed by: OPHTHALMOLOGY

## 2022-05-17 RX ORDER — LOTEPREDNOL ETABONATE 5 MG/ML
1-2 SUSPENSION/ DROPS OPHTHALMIC 3 TIMES DAILY
Qty: 5 ML | Refills: 0 | Status: SHIPPED | OUTPATIENT
Start: 2022-05-17 | End: 2022-05-17

## 2022-05-17 RX ORDER — PREDNISOLONE ACETATE 10 MG/ML
1-2 SUSPENSION/ DROPS OPHTHALMIC 3 TIMES DAILY
Qty: 5 ML | Refills: 0 | Status: SHIPPED | OUTPATIENT
Start: 2022-05-17 | End: 2022-05-22

## 2022-05-17 RX ADMIN — APRACLONIDINE HYDROCHLORIDE 1 DROP: 10 SOLUTION/ DROPS OPHTHALMIC at 09:44

## 2022-05-17 ASSESSMENT — SLIT LAMP EXAM - LIDS
COMMENTS: MEIBOMIAN GLAND DYSFUNCTION
COMMENTS: MEIBOMIAN GLAND DYSFUNCTION

## 2022-05-17 ASSESSMENT — EXTERNAL EXAM - LEFT EYE: OS_EXAM: NORMAL

## 2022-05-17 ASSESSMENT — CONF VISUAL FIELD
METHOD: COUNTING FINGERS
OS_SUPERIOR_TEMPORAL_RESTRICTION: 1
OS_INFERIOR_NASAL_RESTRICTION: 1
OS_SUPERIOR_NASAL_RESTRICTION: 1
OD_NORMAL: 1
OS_INFERIOR_TEMPORAL_RESTRICTION: 1

## 2022-05-17 ASSESSMENT — VISUAL ACUITY
OD_CC: 20/20
CORRECTION_TYPE: GLASSES
OD_CC+: -3
OS_CC: 7/200E
METHOD: SNELLEN - LINEAR

## 2022-05-17 ASSESSMENT — REFRACTION_WEARINGRX
OS_SPHERE: +0.75
OD_ADD: +2.50
OS_ADD: +2.50
OD_CYLINDER: +0.50
SPECS_TYPE: BIFOCAL
OS_CYLINDER: +0.50
OD_SPHERE: +1.00
OS_AXIS: 025
OD_AXIS: 155

## 2022-05-17 ASSESSMENT — CUP TO DISC RATIO: OD_RATIO: 0.7

## 2022-05-17 ASSESSMENT — TONOMETRY
IOP_METHOD: APPLANATION
OD_IOP_MMHG: 17
OS_IOP_MMHG: 17

## 2022-05-17 ASSESSMENT — EXTERNAL EXAM - RIGHT EYE: OD_EXAM: NORMAL

## 2022-05-17 NOTE — NURSING NOTE
Chief Complaints and History of Present Illnesses   Patient presents with     Follow Up     Severe stage glaucoma     Chief Complaint(s) and History of Present Illness(es)     Follow Up     Laterality: both eyes    Course: stable    Associated symptoms: Negative for glare, dryness, eye pain, redness and headache    Treatments tried: eye drops    Pain scale: 0/10    Comments: Severe stage glaucoma              Comments     He states that his vision has seemed stable in both eyes, since his last eye exam.    He uses :  Cosopt twice a day in his left eye (he might miss using the med once or twice a month)    ALISA Medina 8:29 AM  May 17, 2022

## 2022-05-17 NOTE — PROGRESS NOTES
CC: glaucoma follow up      HPI: Jm Cortez is here for glaucoma follow up.     s/p Diode TSCPC OS (3/24/15 by Dr. Loaiza), s/p Trab OD x2 (9/2011 and 7/03), s/p Trab OS (11/2000), s/p IT Ahmed tube OS (2/21/06), s/p Trabectome OD (3/11/2010), s/p Baerveldt OS prior to 2003, s/p SLT OD in 2008, s/p ALT OD in 200)     A/P:  1)POAG --     -  FHX of Glc: No     - K pachy: 580/593 in 2014   Tmax:  OS:27 per old notes   CDR:       Gonio:       - HVF: OD:Nasal dec sens (fluctuation) stable compared to previous and OS:Dense superior and inferuor arcaute defects (temproal island) not repeating since 2017     - HRT/OCT: Mod-Severe RNFL thinning (stable from 8041-2579         - Intolerant to: AGN -- injection, Lumigan?travatan -- injection     Asthma/COPD: No   Steroid Use: No    Kidney Stones: No    Sulfa Allergy: No     IOP targets:    2)PCIOL OS -- VA OD:20/25 and OS:20/400 in 2011   3)anterior capsular membrane left eye: may benefit from yag laser capsulotomy; R/B/A discussed; will do today; prednisolone TID x 5 days (lotemax not available at pharmacy)    4)?H/O Uveitis per old notes    5)NS OD -- works at [employer]       Patient will continue on Cosopt (Timolol/Dorzolamide) which is a blue top drop 2x/day (12 hours apart) in the LEFT EYE ONLY.  Patient will return to clinic in 4-6 months with LVC left eye only and IOP check      Complete documentation of historical and exam elements from today's encounter can be found in the full encounter summary report (not reduplicated in this progress note). I personally obtained the chief complaint(s) and history of present illness.  I confirmed and edited as necessary the review of systems, past medical/surgical history, family history, social history, and examination findings as documented by others; and I examined the patient myself. I personally reviewed the relevant tests, images, and reports as documented above. I formulated and edited as necessary the  assessment and plan and discussed the findings and management plan with the patient and family.    Kartik Soliz MD

## 2022-05-17 NOTE — TELEPHONE ENCOUNTER
Rx for prednisolone was sent by Dr. Soliz earlier today    Called pharmacy to review if has any further question/confirm Rx and was not able to reach personnel      Delonte Case RN 2:20 PM 05/17/22        [facility]    Phone Message    May a detailed message be left on voicemail: yes     Reason for Call: Other: [facility] [address] [place] [address] calling to see if Pt could be presribed Prednisoline instead of loteprednol (LOTEMAX) 0.5 % ophthalmic suspension, as they don't have the loteprednol in stock, plus it is also expensive, per Tova.    Action Taken: Message routed to:  [facility]    Travel Screening: Not Applicable

## 2022-11-08 DIAGNOSIS — H40.1133 PRIMARY OPEN ANGLE GLAUCOMA OF BOTH EYES, SEVERE STAGE: Primary | ICD-10-CM

## 2023-02-13 DIAGNOSIS — H40.9 SEVERE STAGE GLAUCOMA: ICD-10-CM

## 2023-02-14 DIAGNOSIS — H40.1133 PRIMARY OPEN ANGLE GLAUCOMA OF BOTH EYES, SEVERE STAGE: Primary | ICD-10-CM

## 2023-02-14 RX ORDER — DORZOLAMIDE HYDROCHLORIDE AND TIMOLOL MALEATE 20; 5 MG/ML; MG/ML
1 SOLUTION/ DROPS OPHTHALMIC 2 TIMES DAILY
Qty: 10 ML | Refills: 1 | Status: SHIPPED | OUTPATIENT
Start: 2023-02-14 | End: 2023-02-28

## 2023-02-28 ENCOUNTER — OFFICE VISIT (OUTPATIENT)
Dept: OPHTHALMOLOGY | Facility: CLINIC | Age: 68
End: 2023-02-28
Attending: OPHTHALMOLOGY
Payer: COMMERCIAL

## 2023-02-28 DIAGNOSIS — H26.492 PCO (POSTERIOR CAPSULAR OPACIFICATION), LEFT: ICD-10-CM

## 2023-02-28 DIAGNOSIS — H40.9 SEVERE STAGE GLAUCOMA: ICD-10-CM

## 2023-02-28 DIAGNOSIS — H40.1133 PRIMARY OPEN ANGLE GLAUCOMA OF BOTH EYES, SEVERE STAGE: Primary | ICD-10-CM

## 2023-02-28 DIAGNOSIS — Z96.1 PSEUDOPHAKIA OF LEFT EYE: ICD-10-CM

## 2023-02-28 DIAGNOSIS — H25.11 NUCLEAR SENILE CATARACT OF RIGHT EYE: ICD-10-CM

## 2023-02-28 PROCEDURE — 99214 OFFICE O/P EST MOD 30 MIN: CPT | Mod: GC | Performed by: OPHTHALMOLOGY

## 2023-02-28 PROCEDURE — G0463 HOSPITAL OUTPT CLINIC VISIT: HCPCS | Mod: 25

## 2023-02-28 PROCEDURE — 92133 CPTRZD OPH DX IMG PST SGM ON: CPT | Performed by: OPHTHALMOLOGY

## 2023-02-28 PROCEDURE — 92083 EXTENDED VISUAL FIELD XM: CPT | Performed by: OPHTHALMOLOGY

## 2023-02-28 RX ORDER — TIMOLOL MALEATE 5 MG/ML
1 SOLUTION/ DROPS OPHTHALMIC 2 TIMES DAILY
Qty: 10 ML | Refills: 3 | Status: SHIPPED | OUTPATIENT
Start: 2023-02-28 | End: 2023-09-15

## 2023-02-28 RX ORDER — DORZOLAMIDE HYDROCHLORIDE AND TIMOLOL MALEATE 20; 5 MG/ML; MG/ML
1 SOLUTION/ DROPS OPHTHALMIC 2 TIMES DAILY
Qty: 10 ML | Refills: 1 | Status: SHIPPED | OUTPATIENT
Start: 2023-02-28 | End: 2023-09-21

## 2023-02-28 ASSESSMENT — CONF VISUAL FIELD
METHOD: COUNTING FINGERS
OD_INFERIOR_NASAL_RESTRICTION: 0
OD_NORMAL: 1
OS_SUPERIOR_NASAL_RESTRICTION: 1
OS_INFERIOR_NASAL_RESTRICTION: 3
OS_SUPERIOR_TEMPORAL_RESTRICTION: 3
OD_INFERIOR_TEMPORAL_RESTRICTION: 0
OS_INFERIOR_TEMPORAL_RESTRICTION: 3
OD_SUPERIOR_NASAL_RESTRICTION: 0
OD_SUPERIOR_TEMPORAL_RESTRICTION: 0

## 2023-02-28 ASSESSMENT — REFRACTION_WEARINGRX
OS_ADD: +2.50
OS_CYLINDER: +0.50
OS_SPHERE: +0.75
OS_AXIS: 025
OD_SPHERE: +1.00
SPECS_TYPE: BIFOCAL
OD_ADD: +2.50
OD_AXIS: 155
OD_CYLINDER: +0.50

## 2023-02-28 ASSESSMENT — SLIT LAMP EXAM - LIDS
COMMENTS: MGD
COMMENTS: MGD

## 2023-02-28 ASSESSMENT — TONOMETRY
IOP_METHOD: TONOPEN
OD_IOP_MMHG: 14
OS_IOP_MMHG: 18

## 2023-02-28 ASSESSMENT — VISUAL ACUITY
CORRECTION_TYPE: GLASSES
OS_CC: 5/200 E
METHOD: SNELLEN - LINEAR
OD_CC: 20/20

## 2023-02-28 ASSESSMENT — EXTERNAL EXAM - RIGHT EYE: OD_EXAM: NORMAL

## 2023-02-28 ASSESSMENT — EXTERNAL EXAM - LEFT EYE: OS_EXAM: NORMAL

## 2023-02-28 NOTE — NURSING NOTE
Chief Complaints and History of Present Illnesses   Patient presents with     Primary Open Angle Glaucoma Follow Up     9 month follow up both eyes     Chief Complaint(s) and History of Present Illness(es)     Primary Open Angle Glaucoma Follow Up            Comments: 9 month follow up both eyes          Comments    Pt states vision has been good since last visit. No eye pain today. No flashes or floaters.  No redness or dryness.    GIOVANNI Jones February 28, 2023 10:52 AM                        Impression: Dermatochalasis of right upper lid: H02.831. Plan: Dermatochalasis bilateral UL's - Patient is not symptomatic. Explained how it may become visually significant. The patient is advised to contact us for any change or obstruction of vision.

## 2023-02-28 NOTE — PROGRESS NOTES
CC: glaucoma follow up    HPI: Jm Cortez is here for glaucoma follow up.     Interval History Feb 28, 2023: LCV 5/2022. Unable to get into clinic since returning from travelling until now. Denies any changes in vision since his last visit. No pain, flashes, floaters today. Using cosopt BID only in left eye.     POH:  Diode TSCPC OS (3/24/15 by Dr. Loaiza)  Trab OD x2 (9/2011 and 7/03)  Trab OS (11/2000),   IT JUDY OS (2/21/06),  Trabectome OD (3/11/2010)  BGI OS prior to 2003  SLT OD 2008  ALT OD in 2000     SH: Retired - used to work at [employer]     A/P:  1)POAG --     -  FHX of Glc: No     - K pachy: 580/593 in 2014   Tmax:  OS:27 per old notes   CDR:       Gonio:       - HVF 02/28/23:   OD (24-2): superior nasal step and inferior arcuate - progression from 2021 vs fluctuation (today was not a good day for test per the patient)  OS (LVC): only temporal and inferior island remain, progression compared to 2021   - HRT/OCT:   OD: Mod-Severe RNFL thinning (stable since 2016)         OS: floor effect - diffuse thinning  - Intolerant to: AGN -- injection, Lumigan?travatan -- injection     Asthma/COPD: No   Steroid Use: No    Kidney Stones: No    Sulfa Allergy: No     IOP targets:    2)PCIOL OS -- VA OD:20/25 and OS:20/400 in 2011 - OS 02/28/23 5/200E     3)Anterior capsular membrane left eye: s/p YAG 5/2022     4)?H/O Uveitis per old notes    5)NS OD - BCVA 20/20 OD    Left eye shows stable severe stage glaucoma with some progressive loss of vision islands since 2021. Right eye shows progression of inferior nasal step into an inferior arcuate defect and enlargement of superior nasal step - this is his good eye and is presently not on any drops with IOP in mid to high teens. start timolol QAM OD for goal of low teens for OD to prevent further progression.    Patient will continue on Cosopt (Timolol/Dorzolamide) which is a blue top drop 2x/day (12 hours apart) in the LEFT EYE ONLY.    Patient will  start timolol BID right eye   Patient will return to clinic in 4-6 months with LVC left eye and 24-2 VF right eye and OCT RNFL and IOP check    Christopher Morris MD  Resident Physician - PGY3  Department of Ophthalmology   [facility]    Complete documentation of historical and exam elements from today's encounter can be found in the full encounter summary report (not reduplicated in this progress note). I personally obtained the chief complaint(s) and history of present illness.  I confirmed and edited as necessary the review of systems, past medical/surgical history, family history, social history, and examination findings as documented by others; and I examined the patient myself. I personally reviewed the relevant tests, images, and reports as documented above. I formulated and edited as necessary the assessment and plan and discussed the findings and management plan with the patient and family.    Kartik Soliz MD

## 2023-06-29 ENCOUNTER — TELEPHONE (OUTPATIENT)
Dept: OPHTHALMOLOGY | Facility: CLINIC | Age: 68
End: 2023-06-29
Payer: COMMERCIAL

## 2023-08-15 DIAGNOSIS — H40.1133 PRIMARY OPEN ANGLE GLAUCOMA OF BOTH EYES, SEVERE STAGE: Primary | ICD-10-CM

## 2023-08-29 ENCOUNTER — OFFICE VISIT (OUTPATIENT)
Dept: OPHTHALMOLOGY | Facility: CLINIC | Age: 68
End: 2023-08-29
Attending: OPHTHALMOLOGY
Payer: COMMERCIAL

## 2023-08-29 DIAGNOSIS — Z96.1 PSEUDOPHAKIA OF LEFT EYE: ICD-10-CM

## 2023-08-29 DIAGNOSIS — H40.1133 PRIMARY OPEN ANGLE GLAUCOMA OF BOTH EYES, SEVERE STAGE: Primary | ICD-10-CM

## 2023-08-29 DIAGNOSIS — H26.492 PCO (POSTERIOR CAPSULAR OPACIFICATION), LEFT: ICD-10-CM

## 2023-08-29 DIAGNOSIS — H25.11 NUCLEAR SENILE CATARACT OF RIGHT EYE: ICD-10-CM

## 2023-08-29 PROCEDURE — 92083 EXTENDED VISUAL FIELD XM: CPT | Performed by: OPHTHALMOLOGY

## 2023-08-29 PROCEDURE — 92133 CPTRZD OPH DX IMG PST SGM ON: CPT | Performed by: OPHTHALMOLOGY

## 2023-08-29 PROCEDURE — 99214 OFFICE O/P EST MOD 30 MIN: CPT | Performed by: OPHTHALMOLOGY

## 2023-08-29 PROCEDURE — G0463 HOSPITAL OUTPT CLINIC VISIT: HCPCS | Performed by: OPHTHALMOLOGY

## 2023-08-29 ASSESSMENT — TONOMETRY
OS_IOP_MMHG: 14
OD_IOP_MMHG: 12
OD_IOP_MMHG: 12
OS_IOP_MMHG: 16
IOP_METHOD: TONOPEN
IOP_METHOD: TONOPEN

## 2023-08-29 ASSESSMENT — CONF VISUAL FIELD
OD_INFERIOR_NASAL_RESTRICTION: 0
OS_INFERIOR_TEMPORAL_RESTRICTION: 3
OD_INFERIOR_TEMPORAL_RESTRICTION: 0
METHOD: COUNTING FINGERS
OD_SUPERIOR_NASAL_RESTRICTION: 0
OS_SUPERIOR_TEMPORAL_RESTRICTION: 3
OD_NORMAL: 1
OS_SUPERIOR_NASAL_RESTRICTION: 3
OD_SUPERIOR_TEMPORAL_RESTRICTION: 0
OS_INFERIOR_NASAL_RESTRICTION: 1

## 2023-08-29 ASSESSMENT — REFRACTION_WEARINGRX
OS_SPHERE: +0.75
OS_CYLINDER: +0.50
OD_CYLINDER: +0.50
OD_ADD: +2.50
OS_ADD: +2.50
OD_SPHERE: +1.00
OD_AXIS: 155
OS_AXIS: 025
SPECS_TYPE: BIFOCAL

## 2023-08-29 ASSESSMENT — VISUAL ACUITY
OD_CC+: -1
OS_CC: 5/200 E
METHOD: SNELLEN - LINEAR
CORRECTION_TYPE: GLASSES
OD_CC: 20/20

## 2023-08-29 ASSESSMENT — EXTERNAL EXAM - RIGHT EYE: OD_EXAM: NORMAL

## 2023-08-29 ASSESSMENT — SLIT LAMP EXAM - LIDS
COMMENTS: MGD
COMMENTS: MGD

## 2023-08-29 ASSESSMENT — EXTERNAL EXAM - LEFT EYE: OS_EXAM: NORMAL

## 2023-08-29 NOTE — NURSING NOTE
Chief Complaints and History of Present Illnesses   Patient presents with    Primary Open Angle Glaucoma Follow Up     6 month follow up both eyes.     Chief Complaint(s) and History of Present Illness(es)       Primary Open Angle Glaucoma Follow Up              Comments: 6 month follow up both eyes.              Comments    Pt states vision is about the same as last visit. No eye pain today. No new flashes or floaters.  No redness or dryness.    GIOVANNI Jones August 29, 2023 10:37 AM

## 2023-08-29 NOTE — PROGRESS NOTES
CC: glaucoma follow up    HPI: Jm Cortez is here for glaucoma follow up.     Interval History Aug 29, 2023: LCV 2/28/23. Unable to get into clinic since returning from travelling until now. Denies any changes in vision since his last visit. No pain, flashes, floaters today. Using cosopt BID only in left eye.     POH:  Diode TSCPC OS (3/24/15 by Dr. Loaiza)  Trab OD x2 (9/2011 and 7/03)  Trab OS (11/2000),   IT JUDY OS (2/21/06),  Trabectome OD (3/11/2010)  BGI OS prior to 2003  SLT OD 2008  ALT OD in 2000     SH: Retired - used to work at [employer]  Has moved to [place], [place] and comes here to have his eyes checked     A/P:  1)POAG --     -  FHX of Glc: No     - K pachy: 580/593 in 2014   Tmax:  OS:27 per old notes   CDR:       Gonio:       - HVF 02/28/23:   OD (24-2): superior nasal step and inferior arcuate - progression from 2021 vs fluctuation (today was not a good day for test per the patient)  OS (LVC): only temporal and inferior island remain, progression compared to 2021   - OCT:   OD: Mod-Severe RNFL thinning (stable since 2016)         OS: floor effect - diffuse thinning  - Intolerant to: AGN -- injection, Lumigan?travatan -- injection     Asthma/COPD: No   Steroid Use: No    Kidney Stones: No    Sulfa Allergy: No     IOP targets:    2)PCIOL OS -- VA OD:20/25 and OS:20/400 in 2011 - OS 02/28/23 5/200E     3)Anterior capsular membrane left eye: s/p YAG 5/2022     4)?H/O Uveitis per old notes    5)NS OD - BCVA 20/20 OD    Left eye shows stable severe stage glaucoma with some progressive loss of vision islands since 2021. Right eye showed progression of inferior nasal step into an inferior arcuate defect and enlargement of superior nasal step - he was started on timolol BID OD to prevent further progression. 8/29/23: IOP is 12/15    Patient will continue on Cosopt (Timolol/Dorzolamide) which is a blue top drop 2x/day (12 hours apart) in the LEFT EYE ONLY.    Patient will continue timolol BID  right eye   Patient will return to clinic in 4-6 months with LVC left eye and 24-2 VF right eye and OCT RNFL and IOP check    Complete documentation of historical and exam elements from today's encounter can be found in the full encounter summary report (not reduplicated in this progress note). I personally obtained the chief complaint(s) and history of present illness.  I confirmed and edited as necessary the review of systems, past medical/surgical history, family history, social history, and examination findings as documented by others; and I examined the patient myself. I personally reviewed the relevant tests, images, and reports as documented above. I formulated and edited as necessary the assessment and plan and discussed the findings and management plan with the patient and family.    Kartik Soliz MD

## 2023-09-09 ENCOUNTER — HEALTH MAINTENANCE LETTER (OUTPATIENT)
Age: 68
End: 2023-09-09

## 2023-09-15 DIAGNOSIS — H40.1133 PRIMARY OPEN ANGLE GLAUCOMA OF BOTH EYES, SEVERE STAGE: ICD-10-CM

## 2023-09-16 RX ORDER — TIMOLOL MALEATE 5 MG/ML
1 SOLUTION/ DROPS OPHTHALMIC 2 TIMES DAILY
Qty: 20 ML | Refills: 0 | Status: SHIPPED | OUTPATIENT
Start: 2023-09-16 | End: 2024-02-05

## 2023-09-16 NOTE — TELEPHONE ENCOUNTER
LCV:8/29/2023  [facility]  Last clinic note:  Patient will continue timolol BID right eye

## 2023-09-21 DIAGNOSIS — H40.9 SEVERE STAGE GLAUCOMA: ICD-10-CM

## 2023-09-21 RX ORDER — DORZOLAMIDE HYDROCHLORIDE AND TIMOLOL MALEATE 20; 5 MG/ML; MG/ML
1 SOLUTION/ DROPS OPHTHALMIC 2 TIMES DAILY
Qty: 20 ML | Refills: 1 | Status: SHIPPED | OUTPATIENT
Start: 2023-09-21 | End: 2023-09-27

## 2023-09-21 NOTE — TELEPHONE ENCOUNTER
LCV:8/29/2023  [facility]  NCV: 3-5-24  last clinic note:   Patient will continue on Cosopt (Timolol/Dorzolamide) which is a blue top drop 2x/day (12 hours apart) in the LEFT EYE ONLY.

## 2024-02-05 DIAGNOSIS — H40.1133 PRIMARY OPEN ANGLE GLAUCOMA OF BOTH EYES, SEVERE STAGE: ICD-10-CM

## 2024-02-05 RX ORDER — TIMOLOL MALEATE 5 MG/ML
1 SOLUTION/ DROPS OPHTHALMIC 2 TIMES DAILY
Qty: 10 ML | Refills: 0 | Status: SHIPPED | OUTPATIENT
Start: 2024-02-05 | End: 2024-04-09

## 2024-02-05 NOTE — TELEPHONE ENCOUNTER
LCV:8/29/2023  [facility] ( [facility] 4-6 M)  last clinic note:Patient will continue timolol BID right eye   NCV: 3-5-24

## 2024-04-02 DIAGNOSIS — H40.1133 PRIMARY OPEN ANGLE GLAUCOMA OF BOTH EYES, SEVERE STAGE: Primary | ICD-10-CM

## 2024-04-09 ENCOUNTER — OFFICE VISIT (OUTPATIENT)
Dept: OPHTHALMOLOGY | Facility: CLINIC | Age: 69
End: 2024-04-09
Attending: OPHTHALMOLOGY
Payer: COMMERCIAL

## 2024-04-09 DIAGNOSIS — H40.1133 PRIMARY OPEN ANGLE GLAUCOMA OF BOTH EYES, SEVERE STAGE: Primary | ICD-10-CM

## 2024-04-09 DIAGNOSIS — H25.11 NUCLEAR SENILE CATARACT OF RIGHT EYE: ICD-10-CM

## 2024-04-09 DIAGNOSIS — Z96.1 PSEUDOPHAKIA OF LEFT EYE: ICD-10-CM

## 2024-04-09 DIAGNOSIS — H40.9 SEVERE STAGE GLAUCOMA: ICD-10-CM

## 2024-04-09 PROCEDURE — 99214 OFFICE O/P EST MOD 30 MIN: CPT | Performed by: OPHTHALMOLOGY

## 2024-04-09 PROCEDURE — G0463 HOSPITAL OUTPT CLINIC VISIT: HCPCS | Performed by: OPHTHALMOLOGY

## 2024-04-09 PROCEDURE — 92083 EXTENDED VISUAL FIELD XM: CPT | Performed by: OPHTHALMOLOGY

## 2024-04-09 PROCEDURE — 92133 CPTRZD OPH DX IMG PST SGM ON: CPT | Performed by: OPHTHALMOLOGY

## 2024-04-09 RX ORDER — TIMOLOL MALEATE 5 MG/ML
1 SOLUTION/ DROPS OPHTHALMIC 2 TIMES DAILY
Qty: 10 ML | Refills: 3 | Status: SHIPPED | OUTPATIENT
Start: 2024-04-09

## 2024-04-09 RX ORDER — DORZOLAMIDE HYDROCHLORIDE AND TIMOLOL MALEATE 20; 5 MG/ML; MG/ML
1 SOLUTION/ DROPS OPHTHALMIC 2 TIMES DAILY
Qty: 10 ML | Refills: 5 | Status: SHIPPED | OUTPATIENT
Start: 2024-04-09

## 2024-04-09 ASSESSMENT — VISUAL ACUITY
OD_CC: 20/25
OS_CC: 4/200E
METHOD: SNELLEN - LINEAR
OD_CC+: -1

## 2024-04-09 ASSESSMENT — TONOMETRY
IOP_METHOD: APPLANATION
OD_IOP_MMHG: 12
OS_IOP_MMHG: 12

## 2024-04-09 ASSESSMENT — REFRACTION_WEARINGRX
OD_ADD: +2.50
SPECS_TYPE: BIFOCAL
OS_ADD: +2.50
OD_AXIS: 155
OS_SPHERE: +0.75
OD_CYLINDER: +0.50
OS_CYLINDER: +0.50
OS_AXIS: 025
OD_SPHERE: +1.00

## 2024-04-09 ASSESSMENT — CONF VISUAL FIELD: COMMENTS: VF TODAY

## 2024-04-09 ASSESSMENT — EXTERNAL EXAM - RIGHT EYE: OD_EXAM: NORMAL

## 2024-04-09 ASSESSMENT — SLIT LAMP EXAM - LIDS
COMMENTS: MGD
COMMENTS: MGD

## 2024-04-09 ASSESSMENT — EXTERNAL EXAM - LEFT EYE: OS_EXAM: NORMAL

## 2024-04-09 NOTE — PROGRESS NOTES
CC: glaucoma follow up    HPI: Jm Cortez is here for glaucoma follow up.     Interval History Apr 9, 2024: LCV 8/29/23. Unable to get into clinic since returning from travelling until now. Denies any changes in vision since his last visit. No pain, flashes, floaters today. Using cosopt BID only in left eye.     POH:  Diode TSCPC OS (3/24/15 by Dr. Loaiza)  Trab OD x2 (9/2011 and 7/03)  Trab OS (11/2000),   IT JUDY OS (2/21/06),  Trabectome OD (3/11/2010)  BGI OS prior to 2003  SLT OD 2008  ALT OD in 2000     SH: Retired - used to work at [employer]  Has moved to [place], [place] and comes here to have his eyes checked     A/P:  1)POAG --     -  FHX of Glc: No     - K pachy: 580/593 in 2014   Tmax:  OS:27 per old notes   CDR:       Gonio:       - VF 04/09/2024:   OD (24-2): superior nasal step and inferior arcuate - progression from 2021 vs fluctuation   OS (LVC): only temporal and inferior island remain, progression compared to 2021   - OCT:   OD: Mod-Severe RNFL thinning (stable since 2016)         OS: floor effect - diffuse thinning  - Intolerant to: AGN -- injection, Lumigan?travatan -- injection     Asthma/COPD: No   Steroid Use: No    Kidney Stones: No    Sulfa Allergy: No     IOP targets: low teens    2)PCIOL OS -- VA OD:20/25 and OS:20/400 in 2011 - OS 02/28/23 5/200E     3)Anterior capsular membrane left eye: s/p YAG 5/2022     4)?H/O Uveitis per old notes    5)NS OD - BCVA 20/25 OD    Left eye shows stable severe stage glaucoma with some progressive loss of vision islands since 2021. Right eye showed progression of inferior nasal step into an inferior arcuate defect and enlargement of superior nasal step - he was started on timolol BID OD to prevent further progression. 4/9/24: IOP is 12/12    Patient will continue on Cosopt (Timolol/Dorzolamide) which is a blue top drop 2x/day (12 hours apart) in the LEFT EYE ONLY.    Patient will continue timolol BID right eye     Patient will return to  clinic in 4-6 months with VTD each eye and LVC left eye and 24-2 VF right eye and OCT RNFL and IOP check    Complete documentation of historical and exam elements from today's encounter can be found in the full encounter summary report (not reduplicated in this progress note). I personally obtained the chief complaint(s) and history of present illness.  I confirmed and edited as necessary the review of systems, past medical/surgical history, family history, social history, and examination findings as documented by others; and I examined the patient myself. I personally reviewed the relevant tests, images, and reports as documented above. I formulated and edited as necessary the assessment and plan and discussed the findings and management plan with the patient and family.    Kartik Soliz MD

## 2024-09-17 DIAGNOSIS — H40.1133 PRIMARY OPEN ANGLE GLAUCOMA OF BOTH EYES, SEVERE STAGE: Primary | ICD-10-CM

## 2024-11-02 ENCOUNTER — HEALTH MAINTENANCE LETTER (OUTPATIENT)
Age: 69
End: 2024-11-02